# Patient Record
Sex: FEMALE | Race: WHITE | Employment: OTHER | URBAN - METROPOLITAN AREA
[De-identification: names, ages, dates, MRNs, and addresses within clinical notes are randomized per-mention and may not be internally consistent; named-entity substitution may affect disease eponyms.]

---

## 2018-03-16 ENCOUNTER — APPOINTMENT (OUTPATIENT)
Dept: CT IMAGING | Age: 83
End: 2018-03-16
Attending: NURSE PRACTITIONER
Payer: MEDICARE

## 2018-03-16 ENCOUNTER — APPOINTMENT (OUTPATIENT)
Dept: GENERAL RADIOLOGY | Age: 83
End: 2018-03-16
Attending: NURSE PRACTITIONER
Payer: MEDICARE

## 2018-03-16 ENCOUNTER — HOSPITAL ENCOUNTER (EMERGENCY)
Age: 83
Discharge: HOME | End: 2018-03-16
Attending: EMERGENCY MEDICINE
Payer: MEDICARE

## 2018-03-16 VITALS
HEIGHT: 64 IN | DIASTOLIC BLOOD PRESSURE: 80 MMHG | HEART RATE: 75 BPM | SYSTOLIC BLOOD PRESSURE: 170 MMHG | BODY MASS INDEX: 20.49 KG/M2 | RESPIRATION RATE: 18 BRPM | WEIGHT: 120 LBS | TEMPERATURE: 97.7 F | OXYGEN SATURATION: 95 %

## 2018-03-16 DIAGNOSIS — S01.01XA LACERATION OF SCALP, INITIAL ENCOUNTER: Primary | ICD-10-CM
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
## 2018-03-16 DIAGNOSIS — M54.50 MIDLINE LOW BACK PAIN WITHOUT SCIATICA, UNSPECIFIED CHRONICITY: ICD-10-CM

## 2018-03-16 PROCEDURE — 77030018836 HC SOL IRR NACL ICUM -A

## 2018-03-16 PROCEDURE — 72100 X-RAY EXAM L-S SPINE 2/3 VWS: CPT

## 2018-03-16 PROCEDURE — 99283 EMERGENCY DEPT VISIT LOW MDM: CPT

## 2018-03-16 PROCEDURE — 77030008460 HC STPLR SKN PRECIS 3M -A

## 2018-03-16 PROCEDURE — 74011000250 HC RX REV CODE- 250: Performed by: EMERGENCY MEDICINE

## 2018-03-16 PROCEDURE — 70450 CT HEAD/BRAIN W/O DYE: CPT

## 2018-03-16 PROCEDURE — 75810000293 HC SIMP/SUPERF WND  RPR

## 2018-03-16 RX ADMIN — Medication 2 ML: at 19:26

## 2018-03-16 NOTE — DISCHARGE INSTRUCTIONS
Cuts Closed With Staples: Care Instructions  Your Care Instructions  A cut can happen anywhere on your body. The doctor used staples to close the cut. Staples easily and quickly close a cut, which helps the cut heal.  Sometimes a cut can injure tendons, blood vessels, or nerves. If the cut went deep and through the skin, the doctor may have put in a layer of stitches below the staples. The deeper layer of stitches brings the deep part of the cut together. These stitches will dissolve and don't need to be removed. The staples in the upper layer are what you see on the cut. You may have a bandage. You will need to have the staples removed, usually in 7 to 14 days. The doctor has checked you carefully, but problems can develop later. If you notice any problems or new symptoms, get medical treatment right away. Follow-up care is a key part of your treatment and safety. Be sure to make and go to all appointments, and call your doctor if you are having problems. It's also a good idea to know your test results and keep a list of the medicines you take. How can you care for yourself at home? · Keep the cut dry for the first 24 to 48 hours. After this, you can shower if your doctor okays it. Pat the cut dry. · Don't soak the cut, such as in a bathtub. Your doctor will tell you when it's safe to get the cut wet. · If your doctor told you how to care for your cut, follow your doctor's instructions. If you did not get instructions, follow this general advice:  ¨ After the first 24 to 48 hours, wash around the cut with clean water 2 times a day. Don't use hydrogen peroxide or alcohol, which can slow healing. ¨ You may cover the cut with a thin layer of petroleum jelly, such as Vaseline, and a nonstick bandage. ¨ Apply more petroleum jelly and replace the bandage as needed. · Avoid any activity that could cause your cut to reopen. · Do not remove the staples on your own.  Your doctor will tell you when to come back to have the staples removed. · Take pain medicines exactly as directed. ¨ If the doctor gave you a prescription medicine for pain, take it as prescribed. ¨ If you are not taking a prescription pain medicine, ask your doctor if you can take an over-the-counter medicine. When should you call for help? Call your doctor now or seek immediate medical care if:  ? · You have new pain, or your pain gets worse. ? · The skin near the cut is cold or pale or changes color. ? · You have tingling, weakness, or numbness near the cut.   ? · The cut starts to bleed, and blood soaks through the bandage. Oozing small amounts of blood is normal.   ? · You have trouble moving the area near the cut.   ? · You have symptoms of infection, such as:  ¨ Increased pain, swelling, warmth, or redness around the cut. ¨ Red streaks leading from the cut. ¨ Pus draining from the cut. ¨ A fever. ? Watch closely for changes in your health, and be sure to contact your doctor if:  ? · You do not get better as expected. Where can you learn more? Go to http://erin-sandra.info/. Enter G556 in the search box to learn more about \"Cuts Closed With Staples: Care Instructions. \"  Current as of: March 20, 2017  Content Version: 11.4  © 6332-6244 [employer]. Care instructions adapted under license by [employer] (which disclaims liability or warranty for this information). If you have questions about a medical condition or this instruction, always ask your healthcare professional. [place] [address] any warranty or liability for your use of this information.

## 2018-03-16 NOTE — ED PROVIDER NOTES
HPI       80y F here s/p fall. Had a mechanical fall when transferring from wheelchair to chair. Hit the back of her head she thinks on a closet door. No LOC. No vomiting. Occurred around 4pm today. Has a cut to the top of the head but bleeding controlled. Has chronic back pain and feels this is worse in the lower back. At baseline mental status. No other complaints. No weakness or numbness. Not on blood thinners.     Past Medical History:   Diagnosis Date    Arrhythmia     a fib    Arthritis     osteo    CAD (coronary artery disease)     2 heart attacks    GERD (gastroesophageal reflux disease)     Hypertension     Other ill-defined conditions(799.89)     heart murmur    Other ill-defined conditions(799.89)     urge incontinence/bladder stimulator    Other ill-defined conditions(799.89)     constipation    Other ill-defined conditions(799.89)     high cholesterol    Other ill-defined conditions(799.89)     recurrent UTI's    Other ill-defined conditions(799.89)     osteoporosis    Other ill-defined conditions(799.89)     hyperlipidemia    Other ill-defined conditions(799.89)     ischemic colitis    Other ill-defined conditions(799.89)     foot drop    Other ill-defined conditions(799.89)     peripheral neuropathy    Psychiatric disorder     anxiety    Stroke ([facility] [address]     2 strokes/TIA - right hemiparesis, word finding difficulty    Thromboembolus (HCC)      right arm/blood clot in abdomen    Thyroid disease     hypothyroidism    Unspecified adverse effect of anesthesia     aspiration pneumonia with hip replacement       Past Surgical History:   Procedure Laterality Date    BREAST SURGERY PROCEDURE UNLISTED      multiple lumps removed from both breasts - all benign    CABG, ARTERY-VEIN, THREE      CARDIAC SURG PROCEDURE UNLIST      cardiac cath    HX APPENDECTOMY      HX GI      paraesophageal hernia repair    HX GYN      1/2 right ovary removed    HX HEENT      tonsillectomy    HX HEENT eyelid surgery    HX HYSTERECTOMY      MANDEEP with SSO    HX HYSTERECTOMY      multiple D & C's    HX LAP CHOLECYSTECTOMY      HX ORTHOPAEDIC      right THR    HX ORTHOPAEDIC      rotator cuff repair - left    HX ORTHOPAEDIC      2 foot surgeries - one for fallen metatarsal    HX ORTHOPAEDIC      left TKR    HX OTHER SURGICAL      EGD         Family History:   Problem Relation Age of Onset    Breast Cancer Mother     Cancer Father      lung    Cancer Son      liver and pancreatic    Cancer Son      lymphoma       Social History     Social History    Marital status:      Spouse name: N/A    Number of children: N/A    Years of education: N/A     Occupational History    Not on file. Social History Main Topics    Smoking status: Never Smoker    Smokeless tobacco: Not on file    Alcohol use No    Drug use: Not on file    Sexual activity: Not on file     Other Topics Concern    Not on file     Social History Narrative         ALLERGIES: Levaquin [levofloxacin]; Quinolones; Codeine; Evista [raloxifene]; Mevacor [lovastatin]; Morphine; Perfume ht52; and Sulfa (sulfonamide antibiotics)    Review of Systems   Review of Systems   Constitutional: (-) weight loss. HEENT: (-) stiff neck   Eyes: (-) discharge. Respiratory: (-) for cough. Cardiovascular: (-) syncope. Gastrointestinal: (-) blood in stool. Genitourinary: (-) hematuria. Musculoskeletal: (-) myalgias. Neurological: (-) seizure. Skin: (-) petechiae  Lymph/Immunologic: (-) enlarged lymph nodes  All other systems reviewed and are negative. Vitals:    03/16/18 1740   BP: 167/85   Pulse: 77   Resp: 16   Temp: 97.5 °F (36.4 °C)   SpO2: 95%   Weight: 54.4 kg (120 lb)   Height: 5' 3.5\" (1.613 m)            Physical Exam Nursing note and vitals reviewed. Constitutional: oriented to person, place, and time. appears elderly and frail. No distress. Head: Normocephalic and 1cm laceration to the top of the head.  No active bleeding. Sclera anicteric  Nose: No rhinorrhea  Mouth/Throat: Oropharynx is clear and moist. Pharynx normal  Eyes: Conjunctivae are normal. Pupils are equal, round, and reactive to light. Right eye exhibits no discharge. Left eye exhibits no discharge. Neck: Painless normal range of motion. Neck supple. No LAD. Cardiovascular: Normal rate, regular rhythm, normal heart sounds and intact distal pulses. Exam reveals no gallop and no friction rub. No murmur heard. Pulmonary/Chest:  No respiratory distress. No wheezes. No rales. No rhonchi. No increased work of breathing. No accessory muscle use. Good air exchange throughout. Abdominal: soft, non-tender, no rebound or guarding. No hepatosplenomegaly. Normal bowel sounds throughout. Back: (+) tenderness to palpation of lumbar spine diffusely, no deformities, no CVA tenderness  Extremities/Musculoskeletal: Normal range of motion. no tenderness. No edema. Distal extremities are neurovasc intact. Lymphadenopathy:   No adenopathy. Neurological:  Alert and oriented to person, place, and time. Coordination normal. CN 2-12 intact. Motor and sensory function intact. Skin: Skin is warm and dry. No rash noted. No pallor. MDM 80y F here s/p fall. Appears well. Plan for CT head and xray lumbar spine. Will repair the scalp wound with staples. ED Course       Wound Repair  Date/Time: 3/16/2018 7:55 PM  Performed by: attendingPreparation: skin prepped with ChloraPrep  Location details: scalp    Anesthesia:  Local Anesthetic: LET (lido,epi,tetracaine)  Foreign bodies: no foreign bodies  Irrigation solution: saline  Irrigation method: tap  Debridement: none  Skin closure: staples  Number of sutures: 4  Technique: simple  Approximation: close  Patient tolerance: Patient tolerated the procedure well with no immediate complications  My total time at bedside, performing this procedure was 1-15 minutes.

## 2018-03-16 NOTE — ED TRIAGE NOTES
Per EMS pt fell while transferring herself from the chair to recliner when she lost her balance. Pt fell to floor hitting back of head on floor. Pt has LAC to head with bleeding controlled. Pt denies having LOC. Fall was witnessed by volunteer. Pt also reports lower back pain.

## 2018-03-17 NOTE — ED NOTES
Patient medically cleared for discharge at this time. All discharge instructions provided at this time and questions answered. Patient assisted into a wheelchair and assisted into the car.

## 2018-12-12 ENCOUNTER — APPOINTMENT (OUTPATIENT)
Dept: CT IMAGING | Age: 83
DRG: 699 | End: 2018-12-12
Attending: EMERGENCY MEDICINE
Payer: MEDICARE

## 2018-12-12 ENCOUNTER — HOSPITAL ENCOUNTER (INPATIENT)
Age: 83
LOS: 4 days | Discharge: NURSING, REHAB OR LONG-TERM CARE | DRG: 699 | End: 2018-12-16
Attending: EMERGENCY MEDICINE | Admitting: INTERNAL MEDICINE
Payer: MEDICARE

## 2018-12-12 DIAGNOSIS — R10.84 ABDOMINAL PAIN, GENERALIZED: Primary | ICD-10-CM

## 2018-12-12 DIAGNOSIS — I71.40 ABDOMINAL AORTIC ANEURYSM (AAA) WITHOUT RUPTURE: ICD-10-CM

## 2018-12-12 PROBLEM — I71.20 THORACIC AORTIC ANEURYSM: Status: ACTIVE | Noted: 2018-12-12

## 2018-12-12 LAB
ALBUMIN SERPL-MCNC: 3.9 G/DL (ref 3.5–5)
ALBUMIN/GLOB SERPL: 1.2 {RATIO} (ref 1.1–2.2)
ALP SERPL-CCNC: 82 U/L (ref 45–117)
ALT SERPL-CCNC: 20 U/L (ref 12–78)
ANION GAP SERPL CALC-SCNC: 8 MMOL/L (ref 5–15)
AST SERPL-CCNC: 19 U/L (ref 15–37)
BASOPHILS # BLD: 0.1 K/UL (ref 0–0.1)
BASOPHILS NFR BLD: 1 % (ref 0–1)
BILIRUB SERPL-MCNC: 0.3 MG/DL (ref 0.2–1)
BUN SERPL-MCNC: 33 MG/DL (ref 6–20)
BUN/CREAT SERPL: 23 (ref 12–20)
CALCIUM SERPL-MCNC: 10.3 MG/DL (ref 8.5–10.1)
CHLORIDE SERPL-SCNC: 107 MMOL/L (ref 97–108)
CO2 SERPL-SCNC: 25 MMOL/L (ref 21–32)
COMMENT, HOLDF: NORMAL
CREAT SERPL-MCNC: 1.46 MG/DL (ref 0.55–1.02)
DIFFERENTIAL METHOD BLD: ABNORMAL
EOSINOPHIL # BLD: 0.2 K/UL (ref 0–0.4)
EOSINOPHIL NFR BLD: 2 % (ref 0–7)
ERYTHROCYTE [DISTWIDTH] IN BLOOD BY AUTOMATED COUNT: 15.5 % (ref 11.5–14.5)
GLOBULIN SER CALC-MCNC: 3.3 G/DL (ref 2–4)
GLUCOSE SERPL-MCNC: 104 MG/DL (ref 65–100)
HCT VFR BLD AUTO: 30 % (ref 35–47)
HGB BLD-MCNC: 9 G/DL (ref 11.5–16)
IMM GRANULOCYTES # BLD: 0 K/UL (ref 0–0.04)
IMM GRANULOCYTES NFR BLD AUTO: 1 % (ref 0–0.5)
LYMPHOCYTES # BLD: 2.2 K/UL (ref 0.8–3.5)
LYMPHOCYTES NFR BLD: 24 % (ref 12–49)
MCH RBC QN AUTO: 24.2 PG (ref 26–34)
MCHC RBC AUTO-ENTMCNC: 30 G/DL (ref 30–36.5)
MCV RBC AUTO: 80.6 FL (ref 80–99)
MONOCYTES # BLD: 0.9 K/UL (ref 0–1)
MONOCYTES NFR BLD: 11 % (ref 5–13)
NEUTS SEG # BLD: 5.5 K/UL (ref 1.8–8)
NEUTS SEG NFR BLD: 62 % (ref 32–75)
NRBC # BLD: 0 K/UL (ref 0–0.01)
NRBC BLD-RTO: 0 PER 100 WBC
PLATELET # BLD AUTO: 294 K/UL (ref 150–400)
PMV BLD AUTO: 9.8 FL (ref 8.9–12.9)
POTASSIUM SERPL-SCNC: 4.4 MMOL/L (ref 3.5–5.1)
PROT SERPL-MCNC: 7.2 G/DL (ref 6.4–8.2)
RBC # BLD AUTO: 3.72 M/UL (ref 3.8–5.2)
SAMPLES BEING HELD,HOLD: NORMAL
SODIUM SERPL-SCNC: 140 MMOL/L (ref 136–145)
WBC # BLD AUTO: 8.9 K/UL (ref 3.6–11)

## 2018-12-12 PROCEDURE — 85025 COMPLETE CBC W/AUTO DIFF WBC: CPT

## 2018-12-12 PROCEDURE — 74011636320 HC RX REV CODE- 636/320: Performed by: RADIOLOGY

## 2018-12-12 PROCEDURE — 74174 CTA ABD&PLVS W/CONTRAST: CPT

## 2018-12-12 PROCEDURE — 74011250636 HC RX REV CODE- 250/636: Performed by: EMERGENCY MEDICINE

## 2018-12-12 PROCEDURE — 36415 COLL VENOUS BLD VENIPUNCTURE: CPT

## 2018-12-12 PROCEDURE — 74011000258 HC RX REV CODE- 258: Performed by: RADIOLOGY

## 2018-12-12 PROCEDURE — 99285 EMERGENCY DEPT VISIT HI MDM: CPT

## 2018-12-12 PROCEDURE — 96374 THER/PROPH/DIAG INJ IV PUSH: CPT

## 2018-12-12 PROCEDURE — 77030034850

## 2018-12-12 PROCEDURE — 94761 N-INVAS EAR/PLS OXIMETRY MLT: CPT

## 2018-12-12 PROCEDURE — 71275 CT ANGIOGRAPHY CHEST: CPT

## 2018-12-12 PROCEDURE — 65610000006 HC RM INTENSIVE CARE

## 2018-12-12 PROCEDURE — 80053 COMPREHEN METABOLIC PANEL: CPT

## 2018-12-12 RX ORDER — FENTANYL CITRATE 50 UG/ML
25 INJECTION, SOLUTION INTRAMUSCULAR; INTRAVENOUS
Status: COMPLETED | OUTPATIENT
Start: 2018-12-12 | End: 2018-12-13

## 2018-12-12 RX ORDER — LEVOTHYROXINE SODIUM 100 UG/1
100 TABLET ORAL
COMMUNITY

## 2018-12-12 RX ORDER — MECLIZINE HCL 12.5 MG 12.5 MG/1
12.5 TABLET ORAL
COMMUNITY

## 2018-12-12 RX ORDER — ACETAMINOPHEN 325 MG/1
650 TABLET ORAL
COMMUNITY

## 2018-12-12 RX ORDER — OMEPRAZOLE 20 MG/1
20 CAPSULE, DELAYED RELEASE ORAL DAILY
COMMUNITY
End: 2018-12-16

## 2018-12-12 RX ORDER — GUAIFENESIN 100 MG/5ML
100 SOLUTION ORAL
COMMUNITY

## 2018-12-12 RX ORDER — ROPINIROLE 0.5 MG/1
0.5 TABLET, FILM COATED ORAL
COMMUNITY

## 2018-12-12 RX ORDER — GABAPENTIN 100 MG/1
100 CAPSULE ORAL
COMMUNITY

## 2018-12-12 RX ORDER — HYDRALAZINE HYDROCHLORIDE 10 MG/1
20 TABLET, FILM COATED ORAL 4 TIMES DAILY
COMMUNITY

## 2018-12-12 RX ORDER — ONDANSETRON 4 MG/1
4 TABLET, ORALLY DISINTEGRATING ORAL
COMMUNITY

## 2018-12-12 RX ORDER — SODIUM CHLORIDE, SODIUM LACTATE, POTASSIUM CHLORIDE, CALCIUM CHLORIDE 600; 310; 30; 20 MG/100ML; MG/100ML; MG/100ML; MG/100ML
75 INJECTION, SOLUTION INTRAVENOUS CONTINUOUS
Status: DISCONTINUED | OUTPATIENT
Start: 2018-12-12 | End: 2018-12-13

## 2018-12-12 RX ORDER — ERGOCALCIFEROL 1.25 MG/1
50000 CAPSULE ORAL
COMMUNITY

## 2018-12-12 RX ORDER — ACETAMINOPHEN 325 MG/1
650 TABLET ORAL
Status: DISCONTINUED | OUTPATIENT
Start: 2018-12-12 | End: 2018-12-16 | Stop reason: HOSPADM

## 2018-12-12 RX ORDER — ASPIRIN 81 MG/1
81 TABLET ORAL DAILY
COMMUNITY

## 2018-12-12 RX ORDER — FENTANYL CITRATE 50 UG/ML
25 INJECTION, SOLUTION INTRAMUSCULAR; INTRAVENOUS
Status: COMPLETED | OUTPATIENT
Start: 2018-12-12 | End: 2018-12-12

## 2018-12-12 RX ORDER — CEPHALEXIN 250 MG/1
250 CAPSULE ORAL DAILY
COMMUNITY
End: 2018-12-16

## 2018-12-12 RX ORDER — POLYETHYLENE GLYCOL 3350 17 G/17G
17 POWDER, FOR SOLUTION ORAL
COMMUNITY

## 2018-12-12 RX ORDER — ACETAMINOPHEN 500 MG
500 TABLET ORAL 2 TIMES DAILY
COMMUNITY

## 2018-12-12 RX ORDER — FUROSEMIDE 20 MG/1
20 TABLET ORAL DAILY
COMMUNITY
End: 2018-12-16

## 2018-12-12 RX ORDER — SODIUM CHLORIDE 0.9 % (FLUSH) 0.9 %
10 SYRINGE (ML) INJECTION
Status: COMPLETED | OUTPATIENT
Start: 2018-12-12 | End: 2018-12-12

## 2018-12-12 RX ADMIN — IOPAMIDOL 100 ML: 755 INJECTION, SOLUTION INTRAVENOUS at 21:50

## 2018-12-12 RX ADMIN — FENTANYL CITRATE 25 MCG: 50 INJECTION, SOLUTION INTRAMUSCULAR; INTRAVENOUS at 21:43

## 2018-12-12 RX ADMIN — Medication 10 ML: at 21:50

## 2018-12-12 RX ADMIN — SODIUM CHLORIDE 100 ML: 900 INJECTION, SOLUTION INTRAVENOUS at 21:49

## 2018-12-13 ENCOUNTER — APPOINTMENT (OUTPATIENT)
Dept: CT IMAGING | Age: 83
DRG: 699 | End: 2018-12-13
Attending: INTERNAL MEDICINE
Payer: MEDICARE

## 2018-12-13 LAB
ALBUMIN SERPL-MCNC: 3.1 G/DL (ref 3.5–5)
ALBUMIN/GLOB SERPL: 1.1 {RATIO} (ref 1.1–2.2)
ALP SERPL-CCNC: 65 U/L (ref 45–117)
ALT SERPL-CCNC: 16 U/L (ref 12–78)
AST SERPL-CCNC: 18 U/L (ref 15–37)
BILIRUB DIRECT SERPL-MCNC: <0.1 MG/DL (ref 0–0.2)
BILIRUB SERPL-MCNC: 0.3 MG/DL (ref 0.2–1)
FERRITIN SERPL-MCNC: 18 NG/ML (ref 8–252)
FOLATE SERPL-MCNC: 7.8 NG/ML (ref 5–21)
GLOBULIN SER CALC-MCNC: 2.9 G/DL (ref 2–4)
INR PPP: 1.1 (ref 0.9–1.1)
IRON SATN MFR SERPL: 7 % (ref 20–50)
IRON SERPL-MCNC: 26 UG/DL (ref 35–150)
IRON SERPL-MCNC: 26 UG/DL (ref 35–150)
MAGNESIUM SERPL-MCNC: 2 MG/DL (ref 1.6–2.4)
PROT SERPL-MCNC: 6 G/DL (ref 6.4–8.2)
PROTHROMBIN TIME: 11.3 SEC (ref 9–11.1)
TIBC SERPL-MCNC: 381 UG/DL (ref 250–450)
TSH SERPL DL<=0.05 MIU/L-ACNC: 4.32 UIU/ML (ref 0.36–3.74)
VIT B12 SERPL-MCNC: 512 PG/ML (ref 193–986)

## 2018-12-13 PROCEDURE — 74011000250 HC RX REV CODE- 250: Performed by: INTERNAL MEDICINE

## 2018-12-13 PROCEDURE — 74011250636 HC RX REV CODE- 250/636: Performed by: INTERNAL MEDICINE

## 2018-12-13 PROCEDURE — 74011250637 HC RX REV CODE- 250/637: Performed by: INTERNAL MEDICINE

## 2018-12-13 PROCEDURE — 70450 CT HEAD/BRAIN W/O DYE: CPT

## 2018-12-13 PROCEDURE — 85610 PROTHROMBIN TIME: CPT

## 2018-12-13 PROCEDURE — 83540 ASSAY OF IRON: CPT

## 2018-12-13 PROCEDURE — 82607 VITAMIN B-12: CPT

## 2018-12-13 PROCEDURE — 82728 ASSAY OF FERRITIN: CPT

## 2018-12-13 PROCEDURE — 74011250636 HC RX REV CODE- 250/636: Performed by: EMERGENCY MEDICINE

## 2018-12-13 PROCEDURE — 74011000258 HC RX REV CODE- 258: Performed by: INTERNAL MEDICINE

## 2018-12-13 PROCEDURE — 36415 COLL VENOUS BLD VENIPUNCTURE: CPT

## 2018-12-13 PROCEDURE — 65660000001 HC RM ICU INTERMED STEPDOWN

## 2018-12-13 PROCEDURE — 84443 ASSAY THYROID STIM HORMONE: CPT

## 2018-12-13 PROCEDURE — 80076 HEPATIC FUNCTION PANEL: CPT

## 2018-12-13 PROCEDURE — 83735 ASSAY OF MAGNESIUM: CPT

## 2018-12-13 PROCEDURE — 82746 ASSAY OF FOLIC ACID SERUM: CPT

## 2018-12-13 RX ORDER — POLYETHYLENE GLYCOL 3350 17 G/17G
17 POWDER, FOR SOLUTION ORAL
Status: DISCONTINUED | OUTPATIENT
Start: 2018-12-13 | End: 2018-12-16 | Stop reason: HOSPADM

## 2018-12-13 RX ORDER — LEVOTHYROXINE SODIUM 100 UG/1
100 TABLET ORAL
Status: DISCONTINUED | OUTPATIENT
Start: 2018-12-13 | End: 2018-12-16 | Stop reason: HOSPADM

## 2018-12-13 RX ORDER — ROPINIROLE 0.25 MG/1
0.5 TABLET, FILM COATED ORAL
Status: DISCONTINUED | OUTPATIENT
Start: 2018-12-13 | End: 2018-12-16 | Stop reason: HOSPADM

## 2018-12-13 RX ORDER — PANTOPRAZOLE SODIUM 40 MG/1
40 TABLET, DELAYED RELEASE ORAL
Status: DISCONTINUED | OUTPATIENT
Start: 2018-12-13 | End: 2018-12-16 | Stop reason: HOSPADM

## 2018-12-13 RX ORDER — ASPIRIN 81 MG/1
81 TABLET ORAL DAILY
Status: DISCONTINUED | OUTPATIENT
Start: 2018-12-13 | End: 2018-12-16 | Stop reason: HOSPADM

## 2018-12-13 RX ORDER — GABAPENTIN 100 MG/1
100 CAPSULE ORAL
Status: DISCONTINUED | OUTPATIENT
Start: 2018-12-13 | End: 2018-12-16 | Stop reason: HOSPADM

## 2018-12-13 RX ORDER — SODIUM CHLORIDE 0.9 % (FLUSH) 0.9 %
5-10 SYRINGE (ML) INJECTION EVERY 8 HOURS
Status: DISCONTINUED | OUTPATIENT
Start: 2018-12-13 | End: 2018-12-16 | Stop reason: HOSPADM

## 2018-12-13 RX ORDER — GUAIFENESIN 100 MG/5ML
100 SOLUTION ORAL
Status: DISCONTINUED | OUTPATIENT
Start: 2018-12-13 | End: 2018-12-16 | Stop reason: HOSPADM

## 2018-12-13 RX ORDER — OMEPRAZOLE 20 MG/1
20 CAPSULE, DELAYED RELEASE ORAL DAILY
Status: DISCONTINUED | OUTPATIENT
Start: 2018-12-13 | End: 2018-12-13 | Stop reason: CLARIF

## 2018-12-13 RX ORDER — HEPARIN SODIUM 5000 [USP'U]/ML
5000 INJECTION, SOLUTION INTRAVENOUS; SUBCUTANEOUS EVERY 8 HOURS
Status: DISCONTINUED | OUTPATIENT
Start: 2018-12-13 | End: 2018-12-13

## 2018-12-13 RX ORDER — SODIUM CHLORIDE 0.9 % (FLUSH) 0.9 %
5-10 SYRINGE (ML) INJECTION AS NEEDED
Status: DISCONTINUED | OUTPATIENT
Start: 2018-12-13 | End: 2018-12-16 | Stop reason: HOSPADM

## 2018-12-13 RX ORDER — MECLIZINE HCL 12.5 MG 12.5 MG/1
12.5 TABLET ORAL
Status: DISCONTINUED | OUTPATIENT
Start: 2018-12-13 | End: 2018-12-16 | Stop reason: HOSPADM

## 2018-12-13 RX ORDER — HYDROMORPHONE HYDROCHLORIDE 2 MG/ML
0.2 INJECTION, SOLUTION INTRAMUSCULAR; INTRAVENOUS; SUBCUTANEOUS
Status: DISCONTINUED | OUTPATIENT
Start: 2018-12-13 | End: 2018-12-16 | Stop reason: HOSPADM

## 2018-12-13 RX ORDER — ONDANSETRON 2 MG/ML
4 INJECTION INTRAMUSCULAR; INTRAVENOUS
Status: DISCONTINUED | OUTPATIENT
Start: 2018-12-13 | End: 2018-12-16 | Stop reason: HOSPADM

## 2018-12-13 RX ORDER — BISACODYL 5 MG
5 TABLET, DELAYED RELEASE (ENTERIC COATED) ORAL DAILY PRN
Status: DISCONTINUED | OUTPATIENT
Start: 2018-12-13 | End: 2018-12-16 | Stop reason: HOSPADM

## 2018-12-13 RX ADMIN — ROPINIROLE HYDROCHLORIDE 0.5 MG: 0.25 TABLET, FILM COATED ORAL at 21:38

## 2018-12-13 RX ADMIN — LEVOTHYROXINE SODIUM 100 MCG: 100 TABLET ORAL at 09:10

## 2018-12-13 RX ADMIN — PANTOPRAZOLE SODIUM 40 MG: 40 TABLET, DELAYED RELEASE ORAL at 09:10

## 2018-12-13 RX ADMIN — Medication 10 ML: at 21:39

## 2018-12-13 RX ADMIN — POLYETHYLENE GLYCOL 3350 17 G: 17 POWDER, FOR SOLUTION ORAL at 21:39

## 2018-12-13 RX ADMIN — GABAPENTIN 100 MG: 100 CAPSULE ORAL at 21:38

## 2018-12-13 RX ADMIN — LABETALOL HYDROCHLORIDE 2 MG/MIN: 5 INJECTION, SOLUTION INTRAVENOUS at 08:57

## 2018-12-13 RX ADMIN — ASPIRIN 81 MG: 81 TABLET, COATED ORAL at 09:10

## 2018-12-13 RX ADMIN — HEPARIN SODIUM 5000 UNITS: 5000 INJECTION, SOLUTION INTRAVENOUS; SUBCUTANEOUS at 02:30

## 2018-12-13 RX ADMIN — ACETAMINOPHEN 650 MG: 325 TABLET ORAL at 11:45

## 2018-12-13 RX ADMIN — FENTANYL CITRATE 25 MCG: 50 INJECTION, SOLUTION INTRAMUSCULAR; INTRAVENOUS at 00:40

## 2018-12-13 RX ADMIN — SODIUM CHLORIDE 2.5 MG/HR: 900 INJECTION, SOLUTION INTRAVENOUS at 00:49

## 2018-12-13 RX ADMIN — HYDROMORPHONE HYDROCHLORIDE 0.2 MG: 2 INJECTION INTRAMUSCULAR; INTRAVENOUS; SUBCUTANEOUS at 05:36

## 2018-12-13 RX ADMIN — SODIUM CHLORIDE, SODIUM LACTATE, POTASSIUM CHLORIDE, AND CALCIUM CHLORIDE 75 ML/HR: 600; 310; 30; 20 INJECTION, SOLUTION INTRAVENOUS at 00:48

## 2018-12-13 RX ADMIN — Medication 10 ML: at 16:01

## 2018-12-13 NOTE — ED PROVIDER NOTES
80 y.o. female with extensive past medical history, please see list, significant for atrial fibrillation, CAD, HTN, DVT, GERD, anxiety, CVA, TIA, ischemic colitis, who presents via EMS with son to the ED with cc of vaginal pain. Per son, pt has been complaining today of vaginal pain with a sensation of \"something pulling and hurting her vagina. \" He states she has been asking staff at her living facility to \"pull down her pants because they're too tight. \" Son reports pt has a suprapubic catheter in place that was last changed 2 weeks ago. He states her last BM was earlier today. Of note, he states she had a hernia to her lower abdomen \"a long, long time ago\" that would \"sometimes come out and go back in.\"    There are no other acute medical concerns at this time. Social Hx: Never Tobacco use, denies EtOH use  PCP: Ramiro Bass MD    Note written by Geri Mims, as dictated by Josh Calderon MD 7:56 PM        The history is provided by the patient and a relative. No  was used.         Past Medical History:   Diagnosis Date    Arrhythmia     a fib    Arthritis     osteo    CAD (coronary artery disease)     2 heart attacks    GERD (gastroesophageal reflux disease)     Hypertension     Other ill-defined conditions(799.89)     heart murmur    Other ill-defined conditions(799.89)     urge incontinence/bladder stimulator    Other ill-defined conditions(799.89)     constipation    Other ill-defined conditions(799.89)     high cholesterol    Other ill-defined conditions(799.89)     recurrent UTI's    Other ill-defined conditions(799.89)     osteoporosis    Other ill-defined conditions(799.89)     hyperlipidemia    Other ill-defined conditions(799.89)     ischemic colitis    Other ill-defined conditions(799.89)     foot drop    Other ill-defined conditions(799.89)     peripheral neuropathy    Psychiatric disorder     anxiety    Stroke [facility])     2 strokes/TIA - right hemiparesis, word finding difficulty    Thromboembolus ([address]      right arm/blood clot in abdomen    Thyroid disease     hypothyroidism    Unspecified adverse effect of anesthesia     aspiration pneumonia with hip replacement       Past Surgical History:   Procedure Laterality Date    BREAST SURGERY PROCEDURE UNLISTED      multiple lumps removed from both breasts - all benign    CABG, ARTERY-VEIN, THREE      CARDIAC SURG PROCEDURE UNLIST      cardiac cath    HX APPENDECTOMY      HX GI      paraesophageal hernia repair    HX GYN      1/2 right ovary removed    HX HEENT      tonsillectomy    HX HEENT      eyelid surgery    HX HYSTERECTOMY      MANDEEP with SSO    HX HYSTERECTOMY      multiple D & C's    HX LAP CHOLECYSTECTOMY      HX ORTHOPAEDIC      right THR    HX ORTHOPAEDIC      rotator cuff repair - left    HX ORTHOPAEDIC      2 foot surgeries - one for fallen metatarsal    HX ORTHOPAEDIC      left TKR    HX OTHER SURGICAL      EGD         Family History:   Problem Relation Age of Onset    Breast Cancer Mother     Cancer Father         lung    Cancer Son         liver and pancreatic    Cancer Son         lymphoma       Social History     Socioeconomic History    Marital status:      Spouse name: Not on file    Number of children: Not on file    Years of education: Not on file    Highest education level: Not on file   Social Needs    Financial resource strain: Not on file    Food insecurity - worry: Not on file    Food insecurity - inability: Not on file   Paddle[employer] needs - medical: Not on file   [employer] needs - non-medical: Not on file   Occupational History    Not on file   Tobacco Use    Smoking status: Never Smoker   Substance and Sexual Activity    Alcohol use: No    Drug use: Not on file    Sexual activity: Not on file   Other Topics Concern    Not on file   Social History Narrative    Not on file         ALLERGIES: Levaquin [levofloxacin]; Quinolones; Codeine; Evista [raloxifene]; Mevacor [lovastatin]; Morphine; Perfume ht52; and Sulfa (sulfonamide antibiotics)    Review of Systems   Constitutional: Negative for chills and fever. HENT: Negative for rhinorrhea and sore throat. Respiratory: Negative for cough and shortness of breath. Cardiovascular: Negative for chest pain. Gastrointestinal: Negative for abdominal pain, diarrhea, nausea and vomiting. Genitourinary: Positive for vaginal pain. Negative for dysuria and urgency. Musculoskeletal: Negative for arthralgias and back pain. Skin: Negative for rash. Neurological: Negative for dizziness, weakness and light-headedness. There were no vitals filed for this visit. Physical Exam   Vital signs reviewed. Nursing notes reviewed. Const:  No acute distress, well developed, well nourished  Head:  Atraumatic, normocephalic  Eyes:  PERRL, conjunctiva normal, no scleral icterus  Neck:  Supple, trachea midline  Cardiovascular:  RRR, no murmurs, no gallops, no rubs  Resp:  No resp distress, no increased work of breathing, no wheezes, no rhonchi, no rales,  Abd:  Soft, non-tender, non-distended, no rebound, no guarding, no CVA tenderness, large plum-sized firm hernia to LLQ, was able to successfully reduce it  :  No swelling or erythema on external genitalia  MSK:  No pedal edema, normal ROM  Neuro:  Alert and oriented x3, no cranial nerve defect  Skin:  Warm, dry, intact  Psych: normal mood and affect, behavior is normal, judgement and thought content is normal    Note written by Geri West, as dictated by Kearney Spurling, MD 7:56 PM    MDM  Number of Diagnoses or Management Options     Amount and/or Complexity of Data Reviewed  Clinical lab tests: ordered  Tests in the radiology section of CPT®: ordered and reviewed  Review and summarize past medical records: yes    Patient Progress  Patient progress: stable         Pt.  Presents to the ER with severe lower abdominal pain.  Easily reducible hernia. CT shows AAA without rupture or dissection. Unclear cause of her pain. Pt.  To be evaluated for admission by the hospitalist.      Procedures

## 2018-12-13 NOTE — ED TRIAGE NOTES
Triage Note:  Patient arrives from [facility] via EMS complaining of vaginal pain. Patient states \"I feel like something is pulling on my crotch\". Patient has suprapubic catheter that is changed monthly, son says it has been changed within the last 2 weeks. Patient follows commands, answers questions appropriately when spoken to, son is at bedside. 2028:  Patient is calling out, states that \"my legs feel like they are numb and tingly, I feel like I'm bent over, somebody help me\". Advised by family that patient has hx of abdominal aneurysm, notified MD.      Aracelis Ba:  Family leaving at this time, patient is resting quietly. 0215:  Patient is resting with eyes closed, deep even respirations noted. No acute s/s of distress or discomfort. Call bell within reach, will continue to monitor. 4371:  Blood drawn for labwork, patient moved to hospital bed, placed back on monitor, call bell at hand. Will continue to monitor. 0542:  Mouth care provided, patient complaining of pain, medicated per protocol. Patient tolerating sips of water.

## 2018-12-13 NOTE — PROGRESS NOTES
Vascular:    Elderly female with 5.6 cm infrarenal AAA. While her AAA is large enough to warrant repair based on size she is not a candidate for repair based on her age. Will discuss with her son. If her AAA were to rupture would recommend comfort care. No anticoagulation is needed for mural thrombus in AAA.

## 2018-12-13 NOTE — PROGRESS NOTES
Primary Nurse Marisa Hooker RN and THERESE Huber performed a dual skin assessment on this patient No impairment noted

## 2018-12-13 NOTE — PROGRESS NOTES
Admission Medication Reconciliation:    Information obtained from: Medication records from SNF     Significant PMH/Disease States:   Past Medical History:   Diagnosis Date    Arrhythmia     a fib    Arthritis     osteo    CAD (coronary artery disease)     2 heart attacks    GERD (gastroesophageal reflux disease)     Hypertension     Other ill-defined conditions(799.89)     heart murmur    Other ill-defined conditions(799.89)     urge incontinence/bladder stimulator    Other ill-defined conditions(799.89)     constipation    Other ill-defined conditions(799.89)     high cholesterol    Other ill-defined conditions(799.89)     recurrent UTI's    Other ill-defined conditions(799.89)     osteoporosis    Other ill-defined conditions(799.89)     hyperlipidemia    Other ill-defined conditions(799.89)     ischemic colitis    Other ill-defined conditions(799.89)     foot drop    Other ill-defined conditions(799.89)     peripheral neuropathy    Psychiatric disorder     anxiety    Stroke ([facility] [address]     2 strokes/TIA - right hemiparesis, word finding difficulty    Thromboembolus (HCC)      right arm/blood clot in abdomen    Thyroid disease     hypothyroidism    Unspecified adverse effect of anesthesia     aspiration pneumonia with hip replacement       Chief Complaint for this Admission:  Vaginal pain     Allergies:  Levaquin [levofloxacin]; Quinolones; Codeine; Evista [raloxifene]; Mevacor [lovastatin]; Morphine; Perfume ht52; and Sulfa (sulfonamide antibiotics)    Prior to Admission Medications:   Prior to Admission Medications   Prescriptions Last Dose Informant Patient Reported? Taking?   acetaminophen (TYLENOL) 325 mg tablet   Yes Yes   Sig: Take 650 mg by mouth every six (6) hours as needed for Pain. acetaminophen (TYLENOL) 500 mg tablet   Yes Yes   Sig: Take 500 mg by mouth two (2) times a day. aspirin delayed-release 81 mg tablet   Yes Yes   Sig: Take 81 mg by mouth daily.    cephALEXin (KEFLEX) 250 mg capsule   Yes Yes   Sig: Take 250 mg by mouth daily. ergocalciferol (VITAMIN D2) 50,000 unit capsule 12/7/2018  Yes Yes   Sig: Take 50,000 Units by mouth every seven (7) days. Friday   furosemide (LASIX) 20 mg tablet   Yes Yes   Sig: Take 20 mg by mouth daily. gabapentin (NEURONTIN) 100 mg capsule   Yes Yes   Sig: Take 100 mg by mouth nightly. guaiFENesin (ROBITUSSIN) 100 mg/5 mL liquid   Yes Yes   Sig: Take 100 mg by mouth every four (4) hours as needed for Cough. hydrALAZINE (APRESOLINE) 10 mg tablet   Yes Yes   Sig: Take 20 mg by mouth four (4) times daily. Hold for sbp<110 or pulse <60   levothyroxine (SYNTHROID) 100 mcg tablet   Yes Yes   Sig: Take 100 mcg by mouth Daily (before breakfast). meclizine (ANTIVERT) 12.5 mg tablet   Yes Yes   Sig: Take 12.5 mg by mouth every four (4) hours as needed for Nausea. omeprazole (PRILOSEC) 20 mg capsule   Yes Yes   Sig: Take 20 mg by mouth daily. ondansetron (ZOFRAN ODT) 4 mg disintegrating tablet   Yes Yes   Sig: Take 4 mg by mouth every six (6) hours as needed for Nausea. polyethylene glycol (MIRALAX) 17 gram packet   Yes Yes   Sig: Take 17 g by mouth nightly. rOPINIRole (REQUIP) 0.5 mg tablet   Yes Yes   Sig: Take 0.5 mg by mouth nightly. vit a,c & e-lutein-minerals (OCUVITE) tablet   Yes Yes   Sig: Take 1 Tab by mouth daily. Facility-Administered Medications: None         Comments/Recommendations: Med rec completed after review of medications records from [facility]. Since the patient has not been here since 2016, all of her medications were removed and re-entered with the current information.   Notable changes include:    Remove-Trazodone, Tramadol    Ashely Sanders, ShayD

## 2018-12-13 NOTE — PROGRESS NOTES
Spiritual Care Assessment/Progress Note  [facility]      NAME: Tesfaye Rodriguez      MRN: [id number]  AGE: 80 y.o.  SEX: female  Sikhism Affiliation: Presbyterian   Language: English     12/13/2018     Total Time (in minutes): 20     Spiritual Assessment begun in [address] through conversation with:         [x]Patient        [x] Family    [] Friend(s)        Reason for Consult: Emergency Department visit     Spiritual beliefs: (Please include comment if needed)     [x] Identifies with a sofia tradition:         [] Supported by a sofia community:            [] Claims no spiritual orientation:           [] Seeking spiritual identity:                [] Adheres to an individual form of spirituality:           [] Not able to assess:                           Identified resources for coping:      [x] Prayer                               [] Music                  [] Guided Imagery     [] Family/friends                 [] Pet visits     [] Devotional reading                         [] Unknown     [] Other:                                              Interventions offered during this visit: (See comments for more details)    Patient Interventions: Affirmation of sofia, Initial/Spiritual assessment, Critical care, Prayer (assurance of)           Plan of Care:     [x] Support spiritual and/or cultural needs    [] Support AMD and/or advance care planning process      [] Support grieving process   [] Coordinate Rites and/or Rituals    [] Coordination with community clergy   [] No spiritual needs identified at this time   [] Detailed Plan of Care below (See Comments)  [] Make referral to Music Therapy  [] Make referral to Pet Therapy     [] Make referral to Addiction services  [] Make referral to [facility]  [] Make referral to Spiritual Care Partner  [] No future visits requested        [x] Follow up visits as needed     Comments: Initial spiritual assessment in ER 14  Patient/family shared about detailed history of the family. Son of Pt shared about family living in the [facility]. Son shared about his father and how he was a good father and  to his mother. Son spoke about how fortunate he is to have his mother as long as he has. Pt will be 80 yrs old soon.  provided a listening ear as son and Pt shared life stories. Son stated that his mother is having difficulty remembering current issues. . Thierry Palafox offered continued prayed and Spiritual Support. Advised of  Availability.     Leopoldo Cogan,  Intern, MDiv  [phone])

## 2018-12-13 NOTE — PROGRESS NOTES
Bedside and Verbal shift change report given to [address] (oncoming nurse) by Jesus Manuel Reno (offgoing nurse). Report included the following information SBAR, Kardex, Intake/Output, MAR and Recent Results.

## 2018-12-13 NOTE — CONSULTS
[address]    Ro Osei  MR#: [id number]  : 1919  ACCOUNT #: [de-identified]   DATE OF SERVICE: 2018    REASON FOR CONSULTATION:  Abdominal aortic aneurysm. HISTORY OF PRESENT ILLNESS:  The patient is a 80-year-old female who presented to the emergency department last night with complaints of lower abdominal pain. She has a suprapubic catheter in place chronically. As part of her evaluation, she had a CT scan of the chest, abdomen and pelvis. She had apparently been known to have an abdominal aortic aneurysm, previously. The CT scan shows moderate enlargement of the entire aorta with a focal significant enlargement of the infrarenal abdominal aorta with a maximum diameter of 5.6 cm. The patient's symptoms are not felt to be related to her aneurysm. She is unable to give any meaningful history. She is awake and responsive, but does not have a firm grasp of her medical situation. PAST MEDICAL HISTORY:  Hypertension, hypothyroidism, chronic urinary retention requiring a suprapubic catheter. ADMISSION MEDICATIONS:  Aspirin, Lasix, Neurontin, hydralazine, Synthroid, Prilosec, Requip. ALLERGIES:  LEVAQUIN, CODEINE, MORPHINE, SULFA DRUGS, MEVACOR. SOCIAL HISTORY:  She lives in an adult home. She has family members who were with her in the emergency room initially, but are not currently present. FAMILY HISTORY:  Unremarkable. REVIEW OF SYSTEMS:  A full review of systems cannot be obtained because of the patient's mental status. PHYSICAL EXAMINATION:  GENERAL:  She is a thin elderly female in no distress. She is awake, alert and responsive. LUNGS:  Bilateral breath sounds. HEART:  Regular rate and rhythm. ABDOMEN:  Suprapubic catheter in the lower abdomen. EXTREMITIES:  Unremarkable. NEUROLOGIC:  Grossly normal with intact motor and sensory function.     IMPRESSION:  Patient has moderate to large infrarenal abdominal aortic aneurysm. Based on size alone the aneurysm would be repaired. However, because of her age and overall condition, I would not recommend intervention. I do not think her current symptoms relate to her aneurysm and there is no evidence of leak or rupture on CT scan. If her aneurysm were to rupture, I would recommend comfort care as opposed to surgical intervention. We are available to answer questions if they arise. There is no need for anticoagulation based on the presence of mural thrombus within the aneurysm.       MD ELKIN Veloz / PAULA  D: 12/13/2018 09:57     T: 12/13/2018 10:40  JOB #: [id number]

## 2018-12-13 NOTE — CONSULTS
[address] [facility]  Emergency Department Consultation    Admit Date: 12/12/2018  Reason for Consultation: Left Inguinal Hernia    CC:  Claire Farrell is a 80 y.o. female who presents to the ED last night with complaints of pelvic pain. HPI:  History significant for hypertension, hypothyroidism, chronic urinary retention with a suprapubic Messina catheter, memory impairment, abdominal aortic aneurysm, and left inguinal hernia. She was in her usual state of health until last night when she began complaining of vaginal pain and pressure, and repeatedly asking for her pants to be loosened. Her son reports \"it sounded like she was complaining of a hard wedgie and wants her pants down to relieve the pressure. \"       Patient has memory impairment, is unaware that she is in the hospital and provides very limited history. Remainder of history is provided by her son at the bedside. Her pain has improved after fentanyl administration. She states she has had the left inguinal hernia for many years. It is not painful and doesn't cause her any problems. She has chronic urinary retention for which the patient has a suprapubic Messina catheter, which is changed monthly and last changed 2 weeks ago. CTA abd/pelvis 12/12/18:  FINDINGS:   There is diffuse dilatation of the thoracic and abdominal aorta with a focal  infrarenal aortic aneurysm measuring 5.5 x 5.6 cm (series 3, image 142),  previously 4.3 x 4.7 cm. There is mural thrombus. Additionally, the ascending  thoracic aorta measures 4.1 cm in diameter. The distal aortic arch measures 3.4  cm in diameter. The mid descending thoracic aorta measures 3.9 cm in diameter. There is no aortic dissection. There is aortoiliac atherosclerosis. The  bilateral common iliac arteries are normal in caliber.     There is a three-vessel aortic arch.  The celiac, superior mesenteric, renal, and  inferior mesenteric arteries are patent although atherosclerosis is noted at the  vessel origins. There is a small hiatal hernia.      There are no dilated bowel loops. There is a moderate amount of colonic stool. There is no free fluid or free air.   There is a left inguinal hernia containing fat and nondilated bowel loops.     There is a probable suprapubic catheter. Streak artifact from a right hip  prosthesis limits pelvic evaluation. The uterus is surgically absent.     IMPRESSION:   1. Diffuse thoracic and abdominal aortic dilatation with a focal infrarenal  aneurysm measuring 5.6 cm in diameter, previously 4.7 cm on 6/5/2015. There is  mural thrombus, but there is no aortic dissection.    2. There is no other acute abnormality in the chest, abdomen, or pelvis. Chronic/incidental findings are stable as above. She has been seen by Vascular Surgery - she is not a candidate for repair of AAA due to advanced age.        Patient Active Problem List    Diagnosis Date Noted    Thoracic aortic aneurysm ([address] 12/12/2018    Fatigue 08/23/2016    UTI (urinary tract infection) due to urinary indwelling catheter ([address] 08/20/2016    Complete heart block ([address] 08/20/2016    Atrial fibrillation ([address] 08/20/2016    Hypocalcemia 43/95/8209    Metabolic acidosis 10/43/5342    Acute respiratory failure with hypoxemia (HCC) 08/20/2016    Altered mental status 08/09/2015    Urinary tract infection 08/09/2015    UTI (urinary tract infection) 99/60/1590    Metabolic encephalopathy 17/99/7366    VANESA (acute kidney injury) ([address] 03/10/2015    Dehydration 03/10/2015    Hypotensive episode 03/10/2015    Persistent vomiting 02/14/2013    Achalasia of esophagus 02/14/2013    Esophageal motor disorder 02/08/2012     Past Medical History:   Diagnosis Date    Arrhythmia     a fib    Arthritis     osteo    CAD (coronary artery disease)     2 heart attacks    GERD (gastroesophageal reflux disease)     Hypertension     Other ill-defined conditions(799.89) heart murmur    Other ill-defined conditions(799.89)     urge incontinence/bladder stimulator    Other ill-defined conditions(799.89)     constipation    Other ill-defined conditions(799.89)     high cholesterol    Other ill-defined conditions(799.89)     recurrent UTI's    Other ill-defined conditions(799.89)     osteoporosis    Other ill-defined conditions(799.89)     hyperlipidemia    Other ill-defined conditions(799.89)     ischemic colitis    Other ill-defined conditions(799.89)     foot drop    Other ill-defined conditions(799.89)     peripheral neuropathy    Psychiatric disorder     anxiety    Stroke [facility])     2 strokes/TIA - right hemiparesis, word finding difficulty    Thromboembolus (HCC)      right arm/blood clot in abdomen    Thyroid disease     hypothyroidism    Unspecified adverse effect of anesthesia     aspiration pneumonia with hip replacement      Past Surgical History:   Procedure Laterality Date    BREAST SURGERY PROCEDURE UNLISTED      multiple lumps removed from both breasts - all benign    CABG, ARTERY-VEIN, THREE      CARDIAC SURG PROCEDURE UNLIST      cardiac cath    HX APPENDECTOMY      HX GI      paraesophageal hernia repair    HX GYN      1/2 right ovary removed    HX HEENT      tonsillectomy    HX HEENT      eyelid surgery    HX HYSTERECTOMY      MANDEEP with SSO    HX HYSTERECTOMY      multiple D & C's    HX LAP CHOLECYSTECTOMY      HX ORTHOPAEDIC      right THR    HX ORTHOPAEDIC      rotator cuff repair - left    HX ORTHOPAEDIC      2 foot surgeries - one for fallen metatarsal    HX ORTHOPAEDIC      left TKR    HX OTHER SURGICAL      EGD      Social History     Tobacco Use    Smoking status: Never Smoker   Substance Use Topics    Alcohol use: No      Family History   Problem Relation Age of Onset    Breast Cancer Mother     Cancer Father         lung    Cancer Son         liver and pancreatic    Cancer Son         lymphoma      Prior to Admission medications    Medication Sig Start Date End Date Taking? Authorizing Provider   acetaminophen (TYLENOL) 325 mg tablet Take 650 mg by mouth every six (6) hours as needed for Pain. Yes Provider, Historical   meclizine (ANTIVERT) 12.5 mg tablet Take 12.5 mg by mouth every four (4) hours as needed for Nausea. Yes Provider, Historical   vit a,c & e-lutein-minerals (OCUVITE) tablet Take 1 Tab by mouth daily. Yes Provider, Historical   ergocalciferol (VITAMIN D2) 50,000 unit capsule Take 50,000 Units by mouth every seven (7) days. Friday   Yes Provider, Historical   ondansetron (ZOFRAN ODT) 4 mg disintegrating tablet Take 4 mg by mouth every six (6) hours as needed for Nausea. Yes Provider, Historical   guaiFENesin (ROBITUSSIN) 100 mg/5 mL liquid Take 100 mg by mouth every four (4) hours as needed for Cough. Yes Provider, Historical   polyethylene glycol (MIRALAX) 17 gram packet Take 17 g by mouth nightly. Yes Provider, Historical   aspirin delayed-release 81 mg tablet Take 81 mg by mouth daily. Yes Provider, Historical   omeprazole (PRILOSEC) 20 mg capsule Take 20 mg by mouth daily. Yes Provider, Historical   rOPINIRole (REQUIP) 0.5 mg tablet Take 0.5 mg by mouth nightly. Yes Provider, Historical   gabapentin (NEURONTIN) 100 mg capsule Take 100 mg by mouth nightly. Yes Provider, Historical   hydrALAZINE (APRESOLINE) 10 mg tablet Take 20 mg by mouth four (4) times daily. Hold for sbp<110 or pulse <60   Yes Provider, Historical   furosemide (LASIX) 20 mg tablet Take 20 mg by mouth daily. Yes Provider, Historical   levothyroxine (SYNTHROID) 100 mcg tablet Take 100 mcg by mouth Daily (before breakfast). Yes Provider, Historical   acetaminophen (TYLENOL) 500 mg tablet Take 500 mg by mouth two (2) times a day. Yes Provider, Historical   cephALEXin (KEFLEX) 250 mg capsule Take 250 mg by mouth daily.    Yes Provider, Historical     Allergies   Allergen Reactions    Levaquin [Levofloxacin] Other (comments)     Heart block    Quinolones Other (comments)     Heart block    Mevacor [Lovastatin] Other (comments)     Leg cramps. Simvastatin ok.  Evista [Raloxifene] Other (comments)     contraindicated    Morphine Unknown (comments)    Perfume Ht52 Other (comments)     Perfume \"stops up nose and throat\"    Codeine Nausea Only    Sulfa (Sulfonamide Antibiotics) Nausea Only          Subjective:     Review of Systems:    A comprehensive review of systems was negative except for that written in the History of Present Illness. Objective:     Blood pressure 104/90, pulse 62, temperature 97.8 °F (36.6 °C), resp. rate 20, height 5' (1.524 m), weight 49.9 kg (110 lb), SpO2 95 %. Recent Results (from the past 24 hour(s))   METABOLIC PANEL, COMPREHENSIVE    Collection Time: 12/12/18  8:20 PM   Result Value Ref Range    Sodium 140 136 - 145 mmol/L    Potassium 4.4 3.5 - 5.1 mmol/L    Chloride 107 97 - 108 mmol/L    CO2 25 21 - 32 mmol/L    Anion gap 8 5 - 15 mmol/L    Glucose 104 (H) 65 - 100 mg/dL    BUN 33 (H) 6 - 20 MG/DL    Creatinine 1.46 (H) 0.55 - 1.02 MG/DL    BUN/Creatinine ratio 23 (H) 12 - 20      GFR est AA 40 (L) >60 ml/min/1.73m2    GFR est non-AA 33 (L) >60 ml/min/1.73m2    Calcium 10.3 (H) 8.5 - 10.1 MG/DL    Bilirubin, total 0.3 0.2 - 1.0 MG/DL    ALT (SGPT) 20 12 - 78 U/L    AST (SGOT) 19 15 - 37 U/L    Alk.  phosphatase 82 45 - 117 U/L    Protein, total 7.2 6.4 - 8.2 g/dL    Albumin 3.9 3.5 - 5.0 g/dL    Globulin 3.3 2.0 - 4.0 g/dL    A-G Ratio 1.2 1.1 - 2.2     CBC WITH AUTOMATED DIFF    Collection Time: 12/12/18  8:20 PM   Result Value Ref Range    WBC 8.9 3.6 - 11.0 K/uL    RBC 3.72 (L) 3.80 - 5.20 M/uL    HGB 9.0 (L) 11.5 - 16.0 g/dL    HCT 30.0 (L) 35.0 - 47.0 %    MCV 80.6 80.0 - 99.0 FL    MCH 24.2 (L) 26.0 - 34.0 PG    MCHC 30.0 30.0 - 36.5 g/dL    RDW 15.5 (H) 11.5 - 14.5 %    PLATELET 738 091 - 874 K/uL    MPV 9.8 8.9 - 12.9 FL    NRBC 0.0 0  WBC    ABSOLUTE NRBC 0.00 0.00 - 0.01 K/uL    NEUTROPHILS 62 32 - 75 %    LYMPHOCYTES 24 12 - 49 %    MONOCYTES 11 5 - 13 %    EOSINOPHILS 2 0 - 7 %    BASOPHILS 1 0 - 1 %    IMMATURE GRANULOCYTES 1 (H) 0.0 - 0.5 %    ABS. NEUTROPHILS 5.5 1.8 - 8.0 K/UL    ABS. LYMPHOCYTES 2.2 0.8 - 3.5 K/UL    ABS. MONOCYTES 0.9 0.0 - 1.0 K/UL    ABS. EOSINOPHILS 0.2 0.0 - 0.4 K/UL    ABS. BASOPHILS 0.1 0.0 - 0.1 K/UL    ABS. IMM. GRANS. 0.0 0.00 - 0.04 K/UL    DF AUTOMATED     SAMPLES BEING HELD    Collection Time: 12/12/18  8:20 PM   Result Value Ref Range    SAMPLES BEING HELD 1RED 1BLU     COMMENT        Add-on orders for these samples will be processed based on acceptable specimen integrity and analyte stability, which may vary by analyte. MAGNESIUM    Collection Time: 12/13/18  4:25 AM   Result Value Ref Range    Magnesium 2.0 1.6 - 2.4 mg/dL   TSH 3RD GENERATION    Collection Time: 12/13/18  4:25 AM   Result Value Ref Range    TSH 4.32 (H) 0.36 - 3.74 uIU/mL   FOLATE    Collection Time: 12/13/18  4:25 AM   Result Value Ref Range    Folate 7.8 5.0 - 21.0 ng/mL   VITAMIN B12    Collection Time: 12/13/18  4:25 AM   Result Value Ref Range    Vitamin B12 512 193 - 986 pg/mL   IRON    Collection Time: 12/13/18  4:25 AM   Result Value Ref Range    Iron 26 (L) 35 - 150 ug/dL   IRON PROFILE    Collection Time: 12/13/18  4:25 AM   Result Value Ref Range    Iron 26 (L) 35 - 150 ug/dL    TIBC 381 250 - 450 ug/dL    Iron % saturation 7 (L) 20 - 50 %   FERRITIN    Collection Time: 12/13/18  4:25 AM   Result Value Ref Range    Ferritin 18 8 - 252 NG/ML     _____________________  Physical Exam:     General:  Alert, cooperative, no distress, appears stated age. Eyes:   Sclera clear. Throat: Lips, mucosa, and tongue normal.   Neck: Supple, symmetrical, trachea midline. Lungs:   Clear to auscultation bilaterally. Heart:  Regular rate and rhythm. Abdomen:   Soft, non-tender. Suprapubic catheter in place. LLQ hernia, soft, easily reducible.   Bowel sounds normal. Assessment:   Principal Problem:    Thoracic aortic aneurysm ([address] (12/12/2018)    left inguinal hernia - soft and easily reducible, though immediately recurs. Plan:     No acute surgical indication at present  Any intervention would be very high risk  Further plan per Dr. Jose F Worthington    Total time spent with patient: 16 minutes. Signed By: Cleopatra Parr NP     December 13, 2018      Pt seen and examined  Longstanding asymptomatic Left inguinal hernia/   No other complaints   Gen- Alert in NAD  Left Inguinal hernia- Easily reducible  Would continue observation  No plans for repair.    Call if questions

## 2018-12-13 NOTE — PROGRESS NOTES
TRANSFER - IN REPORT:    Verbal report received from Francois(name) on Karen Siddiqi  being received from ED(unit) for routine progression of care      Report consisted of patients Situation, Background, Assessment and   Recommendations(SBAR). Information from the following report(s) SBAR, Kardex, ED Summary, Procedure Summary and MAR was reviewed with the receiving nurse. Opportunity for questions and clarification was provided. Assessment completed upon patients arrival to unit and care assumed.

## 2018-12-13 NOTE — PROGRESS NOTES
Patient is seen and examined this morning. She said she doesn't feel comfortable. She couldn't specify the problem but stated \"I don't feel good\"    Lab and imaging reviewed    CT chest Abdomen   IMPRESSION  IMPRESSION:   1. Diffuse thoracic and abdominal aortic dilatation with a focal infrarenal  aneurysm measuring 5.6 cm in diameter, previously 4.7 cm on 6/5/2015. There is  mural thrombus, but there is no aortic dissection.      2. There is no other acute abnormality in the chest, abdomen, or pelvis. Chronic/incidental findings are stable as above. Assessment   Symptomatic Infrarenal AAA warrant repair   - but patient is not a candidate for surgery per vascular surgery evaluation  - medically manage: better blood pressure control with BB, pain mx and avoid anticoag  - other managements per HPI. - Patient will be kept in the intermediate care  - if rupture will go for comfort care.      Code: DNR

## 2018-12-13 NOTE — PROGRESS NOTES
0800 - Bedside and Verbal shift change report given to winnie candelario (oncoming nurse) by gio (offgoing nurse). Report included the following information SBAR, Kardex, ED Summary, Intake/Output, MAR, Recent Results and Cardiac Rhythm NSR.   1530 - Bedside and Verbal shift change report given to chun (oncoming nurse) by Flex Álvarez (offgoing nurse).  Report included the following information SBAR, Kardex, ED Summary, Intake/Output, MAR, Recent Results and Cardiac Rhythm SR.

## 2018-12-13 NOTE — H&P
[address]  HISTORY AND PHYSICAL      Zina Kerr.  MR#: [id number]  : 1919  ACCOUNT #: [de-identified]   ADMIT DATE: 2018    PRIMARY CARE PHYSICIAN:  Alicja Hayden MD     SOURCE OF INFORMATION:  The patient's son and granddaughter who were present at the bedside. CHIEF COMPLAINT:  Pelvic discomfort. HISTORY OF PRESENT ILLNESS:  This is a 51-year-old woman with a past medical history significant for hypertension, hypothyroidism, chronic urinary retention with a suprapubic Messina catheter, memory impairment, abdominal aortic aneurysm, was in her usual state of health until the day of her presentation at the emergency room when patient started complaining of pelvic pain. Patient has memory impairment, unable to provide history. The patient kept repeating discomfort in this pelvic region, thought that these pants were too tight and wanted the pants to be pulled down. She has chronic urinary retention for which the patient has a suprapubic Messina catheter. The suprapubic Messina catheter is usually changed every month. The catheter was recently changed 2 weeks ago. She was sent to the emergency room from the nursing home where the patient resides. When the patient arrived at the emergency room, CT scan of the chest, abdomen and pelvis was obtained. The CT scan shows a thoracic and abdominal aortic dilatation with focal infrarenal aneurysm. The emergency room physician consulted the vascular surgeon on call. Admission to the hospital was advised. Patient was referred to the hospitalist service for that purpose. There was no history of fever, no rigors and no chills. The patient also has a left inguinal hernia. This was easily reduced in the emergency room by the emergency room physician. PAST MEDICAL HISTORY:  Hypertension, hypothyroidism, chronic urinary retention, memory impairment, left inguinal hernia.     ALLERGIES:  PATIENT IS ALLERGIC TO LEVAQUIN, CODEINE, MORPHINE, SULFA, MEVACOR. MEDICATIONS:  Tylenol 650 mg every 6 hours as needed for pain, aspirin 81 mg daily, Keflex 250 mg daily, Lasix 20 mg daily, Neurontin 300 mg daily at bedtime, Robitussin 100 mg every 4 hours as needed for cough, hydralazine 20 mg 4 times daily, Synthroid 100 mcg daily, Antivert 12.5 mg every 4 hours as needed for nausea, Prilosec 20 mg daily, Zofran 4 mg every 6 hours as needed for nausea, MiraLax 17 grams daily at bedtime, Requip 0.5 mg daily at bedtime. FAMILY HISTORY:  This was reviewed. Her mother had breast cancer. Her father had lung cancer. PAST SURGICAL HISTORY:  This is significant for CABG, appendectomy, tonsillectomy, hysterectomy, cholecystectomy, bilateral hip replacement. SOCIAL HISTORY:  No history of alcohol or tobacco abuse. REVIEW OF SYSTEMS:  Unable to obtain because of the patient's mental status. PHYSICAL EXAMINATION:  GENERAL:  Patient appeared ill, in moderate distress. VITAL SIGNS:  On arrival at the emergency room, temperature 97.4, pulse 88, respiratory rate 20, blood pressure 201/89, oxygen saturation 96% on room air. HEAD:  Normocephalic, atraumatic. EYES:  Normal eye movement, no redness, no drainage, no discharge. EARS:  Normal external ears with no obvious drainage. NOSE:  No deformity, no drainage. MOUTH AND THROAT:  No visible oral lesion. NECK:  Supple, no JVD, no thyromegaly. CHEST:  Clear breath sounds. No wheezing, no crackles. HEART:  Normal S1 and S2, regular. No clinically appreciable murmur. ABDOMEN:  Soft, nontender, normal bowel sounds. Suprapubic Messina catheter noted. Left inguinal hernia noted. CENTRAL NERVOUS SYSTEM:  Alert, not oriented. No gross focal neurological deficit. EXTREMITIES:  No edema. Pulses 2+ bilaterally. MUSCULOSKELETAL:  No obvious joint deformity or swelling. SKIN:  No active skin lesion seen in the exposed part of the body. PSYCHIATRIC:  Unable to mood and affect.   LYMPHATIC SYSTEM:  No cervical lymphadenopathy. DIAGNOSTIC DATA:  CT scan of the chest, abdomen and pelvis shows a thoracic and abdominal aortic dilatation with a focal infrarenal aneurysm. There is a mural thrombus, but there is no aortic dissection. LABORATORY DATA:  Hematology:  WBC 8.9, hemoglobin 9.0, hematocrit 30.0, platelet 895. Chemistry:  Sodium 140, potassium 4.4, chloride 107, CO2 of 25, glucose 104, BUN 33, creatinine 1.46, calcium 10.3, total bilirubin 0.3, ALT 20, AST 19, alkaline phosphatase 82, total protein 7.2, albumin level 3.9, globulin 3.3. ASSESSMENT:  1. Thoracic and abdominal aortic dilatation with a focal infrarenal aneurysm. 2.  Acute kidney injury. 3.  Anemia. 4.  Hypertension. 5.  Hypothyroidism. 6.  Chronic urinary retention. 7.  Left inguinal hernia repair. 8.  Memory impairment. PLAN:  1. Thoracic and abdominal aortic dilatation with a focal infrarenal aneurysm. We will admit the patient for further evaluation and treatment. We will start the patient on Cardene. We will await further recommendation from the surgeon consulted by the emergency room physician. The patient has a mural thrombus. Will leave the decision to anticoagulation for the surgeon to determine. If the surgeon advises anticoagulation, then we will start the patient on anticoagulation for the mural thrombus. Attempt was made to discuss the findings with the surgeon, but a call to the surgeon during the night was not returned. 2.  Acute kidney injury. Will carry out fluid therapy. This is most likely due to volume depletion. We will hold Lasix and avoid nephrotoxic medications. 3.  Anemia. This is most likely due to chronic disease. Will carry anemia workup including checking a stool guaiac to rule out occult gastrointestinal bleed. 4.  Hypertension. Patient will be placed on Cardene for treatment of the aortic aneurysm. This will also help treat the patient's hypertension.   5. Hypothyroidism. We will continue with Synthroid. We will check a TSH level. 6.  Chronic urinary retention. Will continue with suprapubic catheter. 7.  Left inguinal hernia. This was reduced in the emergency room, but the family is concerned about the inguinal hernia as a possible cause of the pelvic pain. General surgery consult will be requested to assist in further evaluation. 8.  Memory impairment. Will continue with supportive therapy. We will obtain a CT scan of the head to rule out acute pathology. 9.  Other issues. CODE STATUS:  THE PATIENT IS A DNR. Will request SCD for DVT prophylaxis.       Vera London MD       RE/LN  D: 12/13/2018 06:01     T: 12/13/2018 09:36  JOB #: [id number]  CC: Samantha Bender MD

## 2018-12-13 NOTE — ED NOTES
5533:  Patient complaining that \"she is strangling\"  Oxygen saturation is at 98%. Patient continues to clear throat, coughing, trying to clear throat. Given more sips of water. Patient repeats over and over, \"I'm going to strangle, I can't breath. O2 sats remain normal.    0615: Attempted to suction back of patient's throat, continues to complain of \"strangling feeling\", respirations elevated. Unsuccessful attempt. Tried more sips of water, tolerating well, does not help throat. 1594:  Continue to remain at bedside, patient continues to cry out \"Please help me, I feel like I could strangle. Encouraging patient to clear throat, continuing to give patient sips of water. Patient  Cleared throat and spit, hard pieces of mucous and phlegm came up and she was able to spit into tissue. Offered more sips of water. Patient still continues to hold throat, oxygen saturation at 99%, respirations WDP. Will continue to monitor patient. [id number] Cockayne with hospitalist on Team 7 to assess patient for downgrade, is coming to see patient.

## 2018-12-14 LAB
ALBUMIN SERPL-MCNC: 3.1 G/DL (ref 3.5–5)
ALBUMIN/GLOB SERPL: 1.1 {RATIO} (ref 1.1–2.2)
ALP SERPL-CCNC: 65 U/L (ref 45–117)
ALT SERPL-CCNC: 16 U/L (ref 12–78)
ANION GAP SERPL CALC-SCNC: 5 MMOL/L (ref 5–15)
APPEARANCE UR: ABNORMAL
APPEARANCE UR: ABNORMAL
AST SERPL-CCNC: 25 U/L (ref 15–37)
BACTERIA URNS QL MICRO: ABNORMAL /HPF
BACTERIA URNS QL MICRO: ABNORMAL /HPF
BASOPHILS # BLD: 0 K/UL (ref 0–0.1)
BASOPHILS NFR BLD: 0 % (ref 0–1)
BILIRUB SERPL-MCNC: 0.3 MG/DL (ref 0.2–1)
BILIRUB UR QL: NEGATIVE
BILIRUB UR QL: NEGATIVE
BUN SERPL-MCNC: 30 MG/DL (ref 6–20)
BUN/CREAT SERPL: 21 (ref 12–20)
CALCIUM SERPL-MCNC: 9.5 MG/DL (ref 8.5–10.1)
CHLORIDE SERPL-SCNC: 109 MMOL/L (ref 97–108)
CO2 SERPL-SCNC: 28 MMOL/L (ref 21–32)
COLOR UR: ABNORMAL
COLOR UR: ABNORMAL
CREAT SERPL-MCNC: 1.41 MG/DL (ref 0.55–1.02)
DIFFERENTIAL METHOD BLD: ABNORMAL
EOSINOPHIL # BLD: 0.2 K/UL (ref 0–0.4)
EOSINOPHIL NFR BLD: 3 % (ref 0–7)
EPITH CASTS URNS QL MICRO: ABNORMAL /LPF
EPITH CASTS URNS QL MICRO: ABNORMAL /LPF
ERYTHROCYTE [DISTWIDTH] IN BLOOD BY AUTOMATED COUNT: 15.5 % (ref 11.5–14.5)
GLOBULIN SER CALC-MCNC: 2.9 G/DL (ref 2–4)
GLUCOSE SERPL-MCNC: 96 MG/DL (ref 65–100)
GLUCOSE UR STRIP.AUTO-MCNC: NEGATIVE MG/DL
GLUCOSE UR STRIP.AUTO-MCNC: NEGATIVE MG/DL
HCT VFR BLD AUTO: 26.2 % (ref 35–47)
HEMOCCULT STL QL: NEGATIVE
HGB BLD-MCNC: 7.7 G/DL (ref 11.5–16)
HGB UR QL STRIP: ABNORMAL
HGB UR QL STRIP: NEGATIVE
IMM GRANULOCYTES # BLD: 0 K/UL (ref 0–0.04)
IMM GRANULOCYTES NFR BLD AUTO: 0 % (ref 0–0.5)
KETONES UR QL STRIP.AUTO: NEGATIVE MG/DL
KETONES UR QL STRIP.AUTO: NEGATIVE MG/DL
LEUKOCYTE ESTERASE UR QL STRIP.AUTO: ABNORMAL
LEUKOCYTE ESTERASE UR QL STRIP.AUTO: ABNORMAL
LYMPHOCYTES # BLD: 1.9 K/UL (ref 0.8–3.5)
LYMPHOCYTES NFR BLD: 28 % (ref 12–49)
MCH RBC QN AUTO: 23.8 PG (ref 26–34)
MCHC RBC AUTO-ENTMCNC: 29.4 G/DL (ref 30–36.5)
MCV RBC AUTO: 80.9 FL (ref 80–99)
MONOCYTES # BLD: 0.6 K/UL (ref 0–1)
MONOCYTES NFR BLD: 9 % (ref 5–13)
NEUTS SEG # BLD: 4 K/UL (ref 1.8–8)
NEUTS SEG NFR BLD: 59 % (ref 32–75)
NITRITE UR QL STRIP.AUTO: POSITIVE
NITRITE UR QL STRIP.AUTO: POSITIVE
NRBC # BLD: 0 K/UL (ref 0–0.01)
NRBC BLD-RTO: 0 PER 100 WBC
PH UR STRIP: 7.5 [PH] (ref 5–8)
PH UR STRIP: 7.5 [PH] (ref 5–8)
PHOSPHATE SERPL-MCNC: 3.4 MG/DL (ref 2.6–4.7)
PLATELET # BLD AUTO: 243 K/UL (ref 150–400)
PMV BLD AUTO: 9.6 FL (ref 8.9–12.9)
POTASSIUM SERPL-SCNC: 4.2 MMOL/L (ref 3.5–5.1)
PROT SERPL-MCNC: 6 G/DL (ref 6.4–8.2)
PROT UR STRIP-MCNC: 30 MG/DL
PROT UR STRIP-MCNC: 30 MG/DL
RBC # BLD AUTO: 3.24 M/UL (ref 3.8–5.2)
RBC #/AREA URNS HPF: ABNORMAL /HPF (ref 0–5)
RBC #/AREA URNS HPF: ABNORMAL /HPF (ref 0–5)
SODIUM SERPL-SCNC: 142 MMOL/L (ref 136–145)
SP GR UR REFRACTOMETRY: 1.02 (ref 1–1.03)
SP GR UR REFRACTOMETRY: 1.03 (ref 1–1.03)
UA: UC IF INDICATED,UAUC: ABNORMAL
UR CULT HOLD, URHOLD: NORMAL
UROBILINOGEN UR QL STRIP.AUTO: 0.2 EU/DL (ref 0.2–1)
UROBILINOGEN UR QL STRIP.AUTO: 0.2 EU/DL (ref 0.2–1)
WBC # BLD AUTO: 6.8 K/UL (ref 3.6–11)
WBC URNS QL MICRO: >100 /HPF (ref 0–4)
WBC URNS QL MICRO: >100 /HPF (ref 0–4)

## 2018-12-14 PROCEDURE — 74011000258 HC RX REV CODE- 258: Performed by: INTERNAL MEDICINE

## 2018-12-14 PROCEDURE — 87186 SC STD MICRODIL/AGAR DIL: CPT

## 2018-12-14 PROCEDURE — 36415 COLL VENOUS BLD VENIPUNCTURE: CPT

## 2018-12-14 PROCEDURE — 94762 N-INVAS EAR/PLS OXIMTRY CONT: CPT

## 2018-12-14 PROCEDURE — 84100 ASSAY OF PHOSPHORUS: CPT

## 2018-12-14 PROCEDURE — 74011250637 HC RX REV CODE- 250/637: Performed by: INTERNAL MEDICINE

## 2018-12-14 PROCEDURE — 85025 COMPLETE CBC W/AUTO DIFF WBC: CPT

## 2018-12-14 PROCEDURE — 81001 URINALYSIS AUTO W/SCOPE: CPT

## 2018-12-14 PROCEDURE — 87077 CULTURE AEROBIC IDENTIFY: CPT

## 2018-12-14 PROCEDURE — 65660000001 HC RM ICU INTERMED STEPDOWN

## 2018-12-14 PROCEDURE — 87086 URINE CULTURE/COLONY COUNT: CPT

## 2018-12-14 PROCEDURE — 94760 N-INVAS EAR/PLS OXIMETRY 1: CPT

## 2018-12-14 PROCEDURE — 74011000250 HC RX REV CODE- 250: Performed by: INTERNAL MEDICINE

## 2018-12-14 PROCEDURE — 74011250636 HC RX REV CODE- 250/636: Performed by: INTERNAL MEDICINE

## 2018-12-14 PROCEDURE — 80053 COMPREHEN METABOLIC PANEL: CPT

## 2018-12-14 PROCEDURE — 77030034850

## 2018-12-14 PROCEDURE — 82272 OCCULT BLD FECES 1-3 TESTS: CPT

## 2018-12-14 RX ORDER — METOPROLOL TARTRATE 25 MG/1
6.25 TABLET, FILM COATED ORAL EVERY 12 HOURS
Status: DISCONTINUED | OUTPATIENT
Start: 2018-12-14 | End: 2018-12-14

## 2018-12-14 RX ORDER — GABAPENTIN 100 MG/1
100 CAPSULE ORAL ONCE
Status: COMPLETED | OUTPATIENT
Start: 2018-12-14 | End: 2018-12-14

## 2018-12-14 RX ORDER — HYDRALAZINE HYDROCHLORIDE 10 MG/1
20 TABLET, FILM COATED ORAL 4 TIMES DAILY
Status: DISCONTINUED | OUTPATIENT
Start: 2018-12-14 | End: 2018-12-16 | Stop reason: HOSPADM

## 2018-12-14 RX ORDER — METOPROLOL TARTRATE 25 MG/1
12.5 TABLET, FILM COATED ORAL EVERY 12 HOURS
Status: DISCONTINUED | OUTPATIENT
Start: 2018-12-14 | End: 2018-12-14

## 2018-12-14 RX ADMIN — PANTOPRAZOLE SODIUM 40 MG: 40 TABLET, DELAYED RELEASE ORAL at 06:59

## 2018-12-14 RX ADMIN — HYDRALAZINE HYDROCHLORIDE 20 MG: 10 TABLET, FILM COATED ORAL at 17:13

## 2018-12-14 RX ADMIN — Medication 10 ML: at 21:20

## 2018-12-14 RX ADMIN — METOPROLOL TARTRATE 12.5 MG: 25 TABLET ORAL at 08:36

## 2018-12-14 RX ADMIN — ACETAMINOPHEN 650 MG: 325 TABLET ORAL at 19:31

## 2018-12-14 RX ADMIN — HYDRALAZINE HYDROCHLORIDE 20 MG: 10 TABLET, FILM COATED ORAL at 13:13

## 2018-12-14 RX ADMIN — GABAPENTIN 100 MG: 100 CAPSULE ORAL at 14:38

## 2018-12-14 RX ADMIN — GABAPENTIN 100 MG: 100 CAPSULE ORAL at 21:09

## 2018-12-14 RX ADMIN — HYDRALAZINE HYDROCHLORIDE 20 MG: 10 TABLET, FILM COATED ORAL at 21:09

## 2018-12-14 RX ADMIN — CEFTRIAXONE 1 G: 1 INJECTION, POWDER, FOR SOLUTION INTRAMUSCULAR; INTRAVENOUS at 13:13

## 2018-12-14 RX ADMIN — Medication 10 ML: at 06:59

## 2018-12-14 RX ADMIN — ROPINIROLE HYDROCHLORIDE 0.5 MG: 0.25 TABLET, FILM COATED ORAL at 21:09

## 2018-12-14 RX ADMIN — LEVOTHYROXINE SODIUM 100 MCG: 100 TABLET ORAL at 06:58

## 2018-12-14 RX ADMIN — ASPIRIN 81 MG: 81 TABLET, COATED ORAL at 08:37

## 2018-12-14 RX ADMIN — Medication 10 ML: at 13:13

## 2018-12-14 NOTE — PROGRESS NOTES
Problem: Aortic Aneurysm Repair: Post Op Day 3  Goal: Treatments/Interventions/Procedures  Outcome: Progressing Towards Goal  Plan for medical management of AAA, orders for Labetalol gtt, pt has been off gtt overnight, Dr. Stuart Hayden paged this am for clarification. Orders received to discontinue Labetalol gtt, pt stated on PO Metoprolol. Pt educated on goal of care, no evidence of learning. Will continue to monitor and educate. Bedside shift change report given to [facility] (oncoming nurse) by Alaina Funes (offgoing nurse). Report included the following information SBAR, Kardex, ED Summary, Procedure Summary, Intake/Output, MAR, Recent Results, Med Rec Status, Cardiac Rhythm NSR, Alarm Parameters  and Quality Measures.

## 2018-12-14 NOTE — PROGRESS NOTES
Bedside shift change report given to Karel Coulter RN (oncoming nurse) by Becky Davies RN (offgoing nurse). Report included the following information SBAR, Kardex, MAR, Accordion, Recent Results and Cardiac Rhythm Sinus [place]/NSR.

## 2018-12-14 NOTE — PROGRESS NOTES
Hospitalist Progress Note  Aidan Moore MD  Answering service: [phone] from in house phone      Date of Service:  2018  NAME:  Sheeba Bynum  :  1919  MRN:  [id number]      Admission Summary:   The patient is a 54-year-old female who presented to the emergency department last night with complaints of lower abdominal pain. She has a suprapubic catheter in place chronically. As part of her evaluation, she had a CT scan of the chest, abdomen and pelvis. She had apparently been known to have an abdominal aortic aneurysm. Interval history / Subjective:   Patient seen and examined this morning. She stated the abdominal pain is better. No fever or other   Significant event reported per nursing. Patient evaluated by Vascular surgery for AAA repair but given her age  She is not a candidate for repair and if rupture will go for comfort care. Additional work up will be done to   Determine the cause of her abdominal pain. 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    - CT abdomen showed   - patient started on Cardene drip in the ED which was later change to labetalol drip to keep low normal BP  - Patient seen by vascular surgery. She isn't a candidate for surgical repair and if rupture will go for comfort care  - no need for anticoagulation  - today start on low dose oral BB and Aspirin   - no need for anticoag due to   - Patient has previous hx of complete heart block and avoided AV derek blockers on discharge. # VANESA on ? CKD  - likely from volume depletion from decreased intake and diuresis  - hold on Lasix  - ct with IVF   - avoid nephrotoxic drug and renally dose medication     # Anemia    - This is most likely due to chronic disease.    - Will carry anemia workup including checking a stool guaiac to rule out occult gastrointestinal bleed. # Hypertension.    - patient started on Cardene drip in the ED which was later change to labetalol drip to keep low normal BP  - switched to oral BB    # Hypothyroidism  - continue with Synthroid  - check a TSH level. # Chronic urinary retention  - suprapubic catheter care and change after UA check     # Left inguinal hernia. - This was reduced in the emergency room, but the family is concerned about the inguinal hernia as a possible cause of the pelvic pain. - General surgery consult will be requested to assist in further evaluation. # Memory impairment. Will continue with supportive therapy. We will obtain a CT scan of the head to rule out acute pathology. # CAD with history of CABG(POA)  - on Aspirin, re-started low dose BB    CODE STATUS:  THE PATIENT IS A DNR. Will request SCD for DVT prophylaxis. Disposition: Will be back to nursing home        Hospital Problems  Date Reviewed: 12/13/2018          Codes Class Noted POA    * (Principal) Thoracic aortic aneurysm [facility]) ICD-10-CM: I71.2  ICD-9-CM: 441.2  12/12/2018 Yes                Review of Systems:   Review of systems were negative except mentioned in HPI  Abdomen: no pain, N/V      Vital Signs:    Last 24hrs VS reviewed since prior progress note. Most recent are:  Visit Vitals  /82 (BP 1 Location: Right arm, BP Patient Position: At rest;Supine)   Pulse 73   Temp 97.6 °F (36.4 °C)   Resp 20   Ht 5' (1.524 m)   Wt 51.3 kg (113 lb 1.5 oz)   SpO2 96%   Breastfeeding?  No   BMI 22.09 kg/m²         Intake/Output Summary (Last 24 hours) at 12/14/2018 0901  Last data filed at 12/13/2018 1600  Gross per 24 hour   Intake 422.5 ml   Output 170 ml   Net 252.5 ml Physical Examination:             Constitutional:  Pleasant old age, stated age, No acute distress, cooperative   ENT:  Oral mucous dry, oropharynx benign. Neck supple,    Resp:  CTA bilaterally. No wheezing/rhonchi/rales. No accessory muscle use   CV:  Regular rhythm, normal rate, no murmurs, gallops, rubs    GI:  Suprapubic urinary cath in place, Soft, non distended, non tender. normoactive bowel sounds, no hepatosplenomegaly     Musculoskeletal:  No edema, warm, 2+ pulses throughout    Neurologic:  Moves all extremities. AAOx3, CN II-XII reviewed  Gait not checked. Patient stated she doesn't walk she is on a wheelchair             Data Review:   I have personally reviewed the labs and imaging     Labs:     Recent Labs     12/14/18 0330 12/12/18 2020   WBC 6.8 8.9   HGB 7.7* 9.0*   HCT 26.2* 30.0*    294     Recent Labs     12/14/18 0330 12/13/18 0425 12/12/18 2020     --  140   K 4.2  --  4.4   *  --  107   CO2 28  --  25   BUN 30*  --  33*   CREA 1.41*  --  1.46*   GLU 96  --  104*   CA 9.5  --  10.3*   MG  --  2.0  --    PHOS 3.4  --   --      Recent Labs     12/14/18 0330 12/13/18  1031 12/12/18 2020   SGOT 25 18 19   ALT 16 16 20   AP 65 65 82   TBILI 0.3 0.3 0.3   TP 6.0* 6.0* 7.2   ALB 3.1* 3.1* 3.9   GLOB 2.9 2.9 3.3     Recent Labs     12/13/18  1031   INR 1.1   PTP 11.3*      Recent Labs     12/13/18 0425   TIBC 381   PSAT 7*   FERR 18      Lab Results   Component Value Date/Time    Folate 7.8 12/13/2018 04:25 AM      No results for input(s): PH, PCO2, PO2 in the last 72 hours. No results for input(s): CPK, CKNDX, TROIQ in the last 72 hours.     No lab exists for component: CPKMB  Lab Results   Component Value Date/Time    Cholesterol, total 179 08/23/2016 03:57 AM    HDL Cholesterol 34 08/23/2016 03:57 AM    LDL, calculated 118.4 (H) 08/23/2016 03:57 AM    Triglyceride 133 08/23/2016 03:57 AM    CHOL/HDL Ratio 5.3 (H) 08/23/2016 03:57 AM       Medications Reviewed: Current Facility-Administered Medications   Medication Dose Route Frequency    metoprolol tartrate (LOPRESSOR) tablet 6.25 mg  6.25 mg Oral Q12H    aspirin delayed-release tablet 81 mg  81 mg Oral DAILY    gabapentin (NEURONTIN) capsule 100 mg  100 mg Oral QHS    guaiFENesin (ROBITUSSIN) 100 mg/5 mL oral liquid 100 mg  100 mg Oral Q4H PRN    levothyroxine (SYNTHROID) tablet 100 mcg  100 mcg Oral ACB    meclizine (ANTIVERT) tablet 12.5 mg  12.5 mg Oral Q6H PRN    polyethylene glycol (MIRALAX) packet 17 g  17 g Oral QHS    rOPINIRole (REQUIP) tablet 0.5 mg  0.5 mg Oral QHS    sodium chloride (NS) flush 5-10 mL  5-10 mL IntraVENous Q8H    sodium chloride (NS) flush 5-10 mL  5-10 mL IntraVENous PRN    HYDROmorphone (DILAUDID) injection 0.2 mg  0.2 mg IntraVENous Q6H PRN    ondansetron (ZOFRAN) injection 4 mg  4 mg IntraVENous Q4H PRN    bisacodyl (DULCOLAX) tablet 5 mg  5 mg Oral DAILY PRN    pantoprazole (PROTONIX) tablet 40 mg  40 mg Oral ACB    acetaminophen (TYLENOL) tablet 650 mg  650 mg Oral Q4H PRN     ______________________________________________________________________  EXPECTED LENGTH OF STAY: 3d 7h  ACTUAL LENGTH OF STAY:          2                 Virgen Harris MD

## 2018-12-14 NOTE — PROGRESS NOTES
Bedside shift change report given to [facility] (oncoming nurse) by Alfred (offgoing nurse). Report included the following information SBAR, Kardex, ED Summary, [facility] and Recent Results.

## 2018-12-14 NOTE — PROGRESS NOTES
Problem: Falls - Risk of  Goal: *Absence of Falls  Document Paul Fall Risk and appropriate interventions in the flowsheet. Outcome: Progressing Towards Goal  Fall Risk Interventions:     Bed is in the lowest position and wheels are locked, call bell is within reach, bathroom light is on during evening hours, gripper socks are on and patient has been instructed to call out for assistance if needed. As of now, patient is free from falls and will continue to be monitored. Problem: Pressure Injury - Risk of  Goal: *Prevention of pressure injury  Document Virgilio Scale and appropriate interventions in the flowsheet.   Outcome: Progressing Towards Goal  Pressure Injury Interventions:  Sensory Interventions: Assess changes in LOC, Check visual cues for pain, Keep linens dry and wrinkle-free, Maintain/enhance activity level, Pressure redistribution bed/mattress (bed type)         Activity Interventions: Assess need for specialty bed, Increase time out of bed, Pressure redistribution bed/mattress(bed type), PT/OT evaluation    Mobility Interventions: Assess need for specialty bed, HOB 30 degrees or less, Pressure redistribution bed/mattress (bed type), PT/OT evaluation    Nutrition Interventions: Document food/fluid/supplement intake, Offer support with meals,snacks and hydration    Friction and Shear Interventions: Apply protective barrier, creams and emollients, HOB 30 degrees or less, Lift sheet

## 2018-12-14 NOTE — PROGRESS NOTES
Suprapubic tube changed per order. RN states Dr. Chetan Dyer office places a 16 Fr with 10 ml balloon. SPT replaced with return of yellow clear urine. Pt tolerated well.     Mel Cardenas Urology RN Coordinator, [phone]

## 2018-12-15 LAB
ANION GAP SERPL CALC-SCNC: 5 MMOL/L (ref 5–15)
BUN SERPL-MCNC: 31 MG/DL (ref 6–20)
BUN/CREAT SERPL: 22 (ref 12–20)
CALCIUM SERPL-MCNC: 9 MG/DL (ref 8.5–10.1)
CHLORIDE SERPL-SCNC: 108 MMOL/L (ref 97–108)
CO2 SERPL-SCNC: 28 MMOL/L (ref 21–32)
CREAT SERPL-MCNC: 1.38 MG/DL (ref 0.55–1.02)
ERYTHROCYTE [DISTWIDTH] IN BLOOD BY AUTOMATED COUNT: 15.4 % (ref 11.5–14.5)
GLUCOSE SERPL-MCNC: 93 MG/DL (ref 65–100)
HCT VFR BLD AUTO: 28 % (ref 35–47)
HGB BLD-MCNC: 8.3 G/DL (ref 11.5–16)
MCH RBC QN AUTO: 24.1 PG (ref 26–34)
MCHC RBC AUTO-ENTMCNC: 29.6 G/DL (ref 30–36.5)
MCV RBC AUTO: 81.4 FL (ref 80–99)
NRBC # BLD: 0 K/UL (ref 0–0.01)
NRBC BLD-RTO: 0 PER 100 WBC
PLATELET # BLD AUTO: 245 K/UL (ref 150–400)
PMV BLD AUTO: 9.8 FL (ref 8.9–12.9)
POTASSIUM SERPL-SCNC: 4.2 MMOL/L (ref 3.5–5.1)
RBC # BLD AUTO: 3.44 M/UL (ref 3.8–5.2)
SODIUM SERPL-SCNC: 141 MMOL/L (ref 136–145)
WBC # BLD AUTO: 6.5 K/UL (ref 3.6–11)

## 2018-12-15 PROCEDURE — 85027 COMPLETE CBC AUTOMATED: CPT

## 2018-12-15 PROCEDURE — 80048 BASIC METABOLIC PNL TOTAL CA: CPT

## 2018-12-15 PROCEDURE — 74011000258 HC RX REV CODE- 258: Performed by: INTERNAL MEDICINE

## 2018-12-15 PROCEDURE — 74011250637 HC RX REV CODE- 250/637: Performed by: INTERNAL MEDICINE

## 2018-12-15 PROCEDURE — 74011250636 HC RX REV CODE- 250/636: Performed by: INTERNAL MEDICINE

## 2018-12-15 PROCEDURE — 77010033678 HC OXYGEN DAILY

## 2018-12-15 PROCEDURE — 65270000029 HC RM PRIVATE

## 2018-12-15 PROCEDURE — 36415 COLL VENOUS BLD VENIPUNCTURE: CPT

## 2018-12-15 RX ORDER — DEXTROSE MONOHYDRATE AND SODIUM CHLORIDE 5; .45 G/100ML; G/100ML
50 INJECTION, SOLUTION INTRAVENOUS CONTINUOUS
Status: DISCONTINUED | OUTPATIENT
Start: 2018-12-15 | End: 2018-12-16 | Stop reason: HOSPADM

## 2018-12-15 RX ADMIN — LEVOTHYROXINE SODIUM 100 MCG: 100 TABLET ORAL at 06:58

## 2018-12-15 RX ADMIN — PANTOPRAZOLE SODIUM 40 MG: 40 TABLET, DELAYED RELEASE ORAL at 06:58

## 2018-12-15 RX ADMIN — CEFTRIAXONE 1 G: 1 INJECTION, POWDER, FOR SOLUTION INTRAMUSCULAR; INTRAVENOUS at 12:40

## 2018-12-15 RX ADMIN — Medication 10 ML: at 06:58

## 2018-12-15 RX ADMIN — ACETAMINOPHEN 650 MG: 325 TABLET ORAL at 18:24

## 2018-12-15 RX ADMIN — HYDRALAZINE HYDROCHLORIDE 20 MG: 10 TABLET, FILM COATED ORAL at 12:38

## 2018-12-15 RX ADMIN — Medication 10 ML: at 21:50

## 2018-12-15 RX ADMIN — ACETAMINOPHEN 650 MG: 325 TABLET ORAL at 08:13

## 2018-12-15 RX ADMIN — Medication 10 ML: at 15:50

## 2018-12-15 RX ADMIN — HYDRALAZINE HYDROCHLORIDE 20 MG: 10 TABLET, FILM COATED ORAL at 18:24

## 2018-12-15 RX ADMIN — HYDROMORPHONE HYDROCHLORIDE 0.2 MG: 2 INJECTION INTRAMUSCULAR; INTRAVENOUS; SUBCUTANEOUS at 12:30

## 2018-12-15 RX ADMIN — ASPIRIN 81 MG: 81 TABLET, COATED ORAL at 08:11

## 2018-12-15 RX ADMIN — DEXTROSE MONOHYDRATE AND SODIUM CHLORIDE 50 ML/HR: 5; .45 INJECTION, SOLUTION INTRAVENOUS at 10:56

## 2018-12-15 RX ADMIN — HYDROMORPHONE HYDROCHLORIDE 0.2 MG: 2 INJECTION INTRAMUSCULAR; INTRAVENOUS; SUBCUTANEOUS at 21:50

## 2018-12-15 RX ADMIN — HYDRALAZINE HYDROCHLORIDE 20 MG: 10 TABLET, FILM COATED ORAL at 08:11

## 2018-12-15 NOTE — PROGRESS NOTES
Hospitalist Progress Note  Awilda Iglesias MD  Answering service: [phone] from in house phone      Date of Service:  12/15/2018  NAME:  Claire Farrell  :  1919  MRN:  [id number]      Admission Summary:   The patient is a 68-year-old female who presented to the emergency department last night with complaints of lower abdominal pain. She has a suprapubic catheter in place chronically. As part of her evaluation, she had a CT scan of the chest, abdomen and pelvis. She had apparently been known to have an abdominal aortic aneurysm. Interval history / Subjective:   Patient seen and examined this morning. She was sleeping, per nurse she was awake the whole morning. No even reported. 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    - CT abdomen showed   - patient started on Cardene drip in the ED which was later change to labetalol drip to keep low normal BP  - Patient seen by vascular surgery. She isn't a candidate for surgical repair and if rupture will go for comfort care  - no need for anticoagulation   - Patient has previous hx of complete heart block and avoided AV derek blockers on discharge. - ct with hydralazine and Aspirin    # Left inguinal hernia. - This was reduced in the emergency room, but the family is concerned about the inguinal hernia as a possible cause of the pelvic pain. - General surgery consult will be requested to assist in further evaluation. # VANESA on ? CKD  - likely from volume depletion from decreased intake and diuresis  - hold on Lasix  - ct with IVF   - avoid nephrotoxic drug and renally dose medication     # Anemia    - This is most likely due to chronic disease.    - Will carry anemia workup including checking a stool guaiac to rule out occult gastrointestinal bleed. # Hypertension   - patient started on Cardene drip in the ED which was later change to labetalol drip to keep low normal BP  - switched to oral BB    # Hypothyroidism  - continue with Synthroid  - check a TSH level. # Chronic urinary retention  - suprapubic catheter care and change after UA check     # Memory impairment. Will continue with supportive therapy. We will obtain a CT scan of the head to rule out acute pathology. # CAD with history of CABG(POA)  - on Aspirin, hold on BB    CODE STATUS:  THE PATIENT IS A DNR. Will request SCD for DVT prophylaxis. Disposition: Will be back to nursing home        Hospital Problems  Date Reviewed: 12/13/2018          Codes Class Noted POA    * (Principal) Thoracic aortic aneurysm [facility]) ICD-10-CM: I71.2  ICD-9-CM: 441.2  12/12/2018 Yes                Review of Systems:   Review of systems were negative except mentioned in HPI  Abdomen: no pain, N/V      Vital Signs:    Last 24hrs VS reviewed since prior progress note. Most recent are:  Visit Vitals  /73   Pulse 63   Temp 97.6 °F (36.4 °C)   Resp 14   Ht 5' (1.524 m)   Wt 56.1 kg (123 lb 11.2 oz)   SpO2 95%   Breastfeeding? No   BMI 24.16 kg/m²         Intake/Output Summary (Last 24 hours) at 12/15/2018 1056  Last data filed at 12/15/2018 0900  Gross per 24 hour   Intake 1620 ml   Output 400 ml   Net 1220 ml        Physical Examination:             Constitutional:  Pleasant old age, stated age, No acute distress, cooperative   ENT:  Oral mucous dry, oropharynx benign. Neck supple,    Resp:  CTA bilaterally. No wheezing/rhonchi/rales.  No accessory muscle use   CV:  Regular rhythm, normal rate, no murmurs, gallops, rubs    GI:  Suprapubic urinary cath in place, Soft, non distended, non tender. normoactive bowel sounds, no hepatosplenomegaly     Musculoskeletal:  No edema, warm, 2+ pulses throughout    Neurologic:  Moves all extremities. AAOx3, CN II-XII reviewed  Gait not checked. Patient stated she doesn't walk she is on a wheelchair             Data Review:   I have personally reviewed the labs and imaging     Labs:     Recent Labs     12/15/18  0738 12/14/18  0330   WBC 6.5 6.8   HGB 8.3* 7.7*   HCT 28.0* 26.2*    243     Recent Labs     12/15/18  0738 12/14/18  0330 12/13/18  0425 12/12/18 2020    142  --  140   K 4.2 4.2  --  4.4    109*  --  107   CO2 28 28  --  25   BUN 31* 30*  --  33*   CREA 1.38* 1.41*  --  1.46*   GLU 93 96  --  104*   CA 9.0 9.5  --  10.3*   MG  --   --  2.0  --    PHOS  --  3.4  --   --      Recent Labs     12/14/18  0330 12/13/18  1031 12/12/18 2020   SGOT 25 18 19   ALT 16 16 20   AP 65 65 82   TBILI 0.3 0.3 0.3   TP 6.0* 6.0* 7.2   ALB 3.1* 3.1* 3.9   GLOB 2.9 2.9 3.3     Recent Labs     12/13/18  1031   INR 1.1   PTP 11.3*      Recent Labs     12/13/18  0425   TIBC 381   PSAT 7*   FERR 18      Lab Results   Component Value Date/Time    Folate 7.8 12/13/2018 04:25 AM      No results for input(s): PH, PCO2, PO2 in the last 72 hours. No results for input(s): CPK, CKNDX, TROIQ in the last 72 hours.     No lab exists for component: CPKMB  Lab Results   Component Value Date/Time    Cholesterol, total 179 08/23/2016 03:57 AM    HDL Cholesterol 34 08/23/2016 03:57 AM    LDL, calculated 118.4 (H) 08/23/2016 03:57 AM    Triglyceride 133 08/23/2016 03:57 AM    CHOL/HDL Ratio 5.3 (H) 08/23/2016 03:57 AM       Medications Reviewed:     Current Facility-Administered Medications   Medication Dose Route Frequency    dextrose 5 % - 0.45% NaCl infusion  50 mL/hr IntraVENous CONTINUOUS    hydrALAZINE (APRESOLINE) tablet 20 mg  20 mg Oral QID    cefTRIAXone (ROCEPHIN) 1 g in 0.9% sodium chloride (MBP/ADV) 50 mL  1 g IntraVENous Q24H    aspirin delayed-release tablet 81 mg  81 mg Oral DAILY    gabapentin (NEURONTIN) capsule 100 mg  100 mg Oral QHS    guaiFENesin (ROBITUSSIN) 100 mg/5 mL oral liquid 100 mg  100 mg Oral Q4H PRN    levothyroxine (SYNTHROID) tablet 100 mcg  100 mcg Oral ACB    meclizine (ANTIVERT) tablet 12.5 mg  12.5 mg Oral Q6H PRN    polyethylene glycol (MIRALAX) packet 17 g  17 g Oral QHS    rOPINIRole (REQUIP) tablet 0.5 mg  0.5 mg Oral QHS    sodium chloride (NS) flush 5-10 mL  5-10 mL IntraVENous Q8H    sodium chloride (NS) flush 5-10 mL  5-10 mL IntraVENous PRN    HYDROmorphone (DILAUDID) injection 0.2 mg  0.2 mg IntraVENous Q6H PRN    ondansetron (ZOFRAN) injection 4 mg  4 mg IntraVENous Q4H PRN    bisacodyl (DULCOLAX) tablet 5 mg  5 mg Oral DAILY PRN    pantoprazole (PROTONIX) tablet 40 mg  40 mg Oral ACB    acetaminophen (TYLENOL) tablet 650 mg  650 mg Oral Q4H PRN     ______________________________________________________________________  EXPECTED LENGTH OF STAY: 3d 7h  ACTUAL LENGTH OF STAY:          3                 Maame Wooten MD

## 2018-12-15 NOTE — PROGRESS NOTES
Problem: Pressure Injury - Risk of  Goal: *Prevention of pressure injury  Document Virgilio Scale and appropriate interventions in the flowsheet. Outcome: Progressing Towards Goal  Pressure redistribution mattress and pillows in use. Patient turned and repositioned frequently. Skin is intact, sacrum red blanchable. Bedside shift change report given to Bridger (oncoming nurse) by Lor Tapia (offgoing nurse).  Report included the following information SBAR, Kardex, ED Summary, Intake/Output, MAR, Recent Results and Cardiac Rhythm SR SB.

## 2018-12-15 NOTE — PROGRESS NOTES
Reason for Admission:   Thoracic aortic aneurysm                   RRAT Score:  8                   Plan for utilizing home health:      None at this time, likely pt will return to [address]. Likelihood of Readmission:  Low                          Transition of Care Plan:   Return to [address]. Unit CM participated in [facility] rounds today. Weekend CM can assist if pt is ready for return to [address].     FITZ Emanuel

## 2018-12-15 NOTE — PROGRESS NOTES
Problem: Infection - Risk of, Urinary Catheter-Associated Urinary Tract Infection  Goal: *Absence of infection signs and symptoms  Outcome: Progressing Towards Goal  Pt started on IV antibiotics yesterday, pt tolerating well. Suprapubic catheter changed yesterday and repeat urinalysis sent off per order. Pt afebrile. Will continue to monitor and educate. TRANSFER - OUT REPORT:    Verbal report given to Manolo Jones RN(name) on Karen S Neeru  being transferred to [facility](unit) for routine progression of care       Report consisted of patients Situation, Background, Assessment and   Recommendations(SBAR). Information from the following report(s) SBAR, Kardex, ED Summary, Procedure Summary, Intake/Output, MAR, Recent Results, Med Rec Status, Cardiac Rhythm NSR, Alarm Parameters  and Quality Measures was reviewed with the receiving nurse. Lines:   Peripheral IV 12/12/18 Right Forearm (Active)   Site Assessment Clean, dry, & intact 12/15/2018  8:00 AM   Phlebitis Assessment 0 12/15/2018  8:00 AM   Infiltration Assessment 0 12/15/2018  8:00 AM   Dressing Status Clean, dry, & intact 12/15/2018  8:00 AM   Dressing Type Tape;Transparent 12/15/2018  8:00 AM   Hub Color/Line Status Pink;Capped 12/15/2018  8:00 AM   Action Taken Open ports on tubing capped 12/15/2018  8:00 AM   Alcohol Cap Used Yes 12/15/2018  8:00 AM        Opportunity for questions and clarification was provided.       Patient transported with:   [employer]

## 2018-12-16 VITALS
SYSTOLIC BLOOD PRESSURE: 181 MMHG | RESPIRATION RATE: 16 BRPM | OXYGEN SATURATION: 93 % | DIASTOLIC BLOOD PRESSURE: 78 MMHG | WEIGHT: 123.7 LBS | HEIGHT: 60 IN | HEART RATE: 73 BPM | TEMPERATURE: 98.2 F | BODY MASS INDEX: 24.29 KG/M2

## 2018-12-16 LAB
ATRIAL RATE: 77 BPM
CALCULATED P AXIS, ECG09: -8 DEGREES
CALCULATED R AXIS, ECG10: -36 DEGREES
CALCULATED T AXIS, ECG11: 107 DEGREES
DIAGNOSIS, 93000: NORMAL
P-R INTERVAL, ECG05: 190 MS
Q-T INTERVAL, ECG07: 364 MS
QRS DURATION, ECG06: 108 MS
QTC CALCULATION (BEZET), ECG08: 411 MS
TROPONIN I SERPL-MCNC: <0.05 NG/ML
VENTRICULAR RATE, ECG03: 77 BPM

## 2018-12-16 PROCEDURE — 74011000250 HC RX REV CODE- 250: Performed by: INTERNAL MEDICINE

## 2018-12-16 PROCEDURE — 94760 N-INVAS EAR/PLS OXIMETRY 1: CPT

## 2018-12-16 PROCEDURE — 74011000258 HC RX REV CODE- 258: Performed by: INTERNAL MEDICINE

## 2018-12-16 PROCEDURE — 74011250636 HC RX REV CODE- 250/636: Performed by: INTERNAL MEDICINE

## 2018-12-16 PROCEDURE — 84484 ASSAY OF TROPONIN QUANT: CPT

## 2018-12-16 PROCEDURE — 74011250637 HC RX REV CODE- 250/637: Performed by: INTERNAL MEDICINE

## 2018-12-16 PROCEDURE — 36415 COLL VENOUS BLD VENIPUNCTURE: CPT

## 2018-12-16 PROCEDURE — 93005 ELECTROCARDIOGRAM TRACING: CPT

## 2018-12-16 RX ORDER — PANTOPRAZOLE SODIUM 40 MG/1
40 TABLET, DELAYED RELEASE ORAL
Qty: 30 TAB | Refills: 1 | Status: SHIPPED | OUTPATIENT
Start: 2018-12-16

## 2018-12-16 RX ADMIN — HYDRALAZINE HYDROCHLORIDE 20 MG: 10 TABLET, FILM COATED ORAL at 14:00

## 2018-12-16 RX ADMIN — DEXTROSE MONOHYDRATE AND SODIUM CHLORIDE 50 ML/HR: 5; .45 INJECTION, SOLUTION INTRAVENOUS at 05:27

## 2018-12-16 RX ADMIN — Medication 10 ML: at 05:27

## 2018-12-16 RX ADMIN — CEFTRIAXONE 1 G: 1 INJECTION, POWDER, FOR SOLUTION INTRAMUSCULAR; INTRAVENOUS at 14:00

## 2018-12-16 RX ADMIN — HYDROMORPHONE HYDROCHLORIDE 0.2 MG: 2 INJECTION INTRAMUSCULAR; INTRAVENOUS; SUBCUTANEOUS at 10:51

## 2018-12-16 RX ADMIN — Medication 10 ML: at 14:00

## 2018-12-16 RX ADMIN — FAMOTIDINE 20 MG: 10 INJECTION, SOLUTION INTRAVENOUS at 10:29

## 2018-12-16 RX ADMIN — HYDROMORPHONE HYDROCHLORIDE 0.2 MG: 2 INJECTION INTRAMUSCULAR; INTRAVENOUS; SUBCUTANEOUS at 03:12

## 2018-12-16 NOTE — DISCHARGE INSTRUCTIONS

## 2018-12-16 NOTE — PROGRESS NOTES
0932: pt restless, medications held at present time. Pt unable to swallow nightly medications. Will continue to monitor.

## 2018-12-16 NOTE — PROGRESS NOTES
Report called to [facility] spoke sukhwinder Arevalo.   Answered all questions -transport set up

## 2018-12-16 NOTE — PROGRESS NOTES
9:04 AM  CM informed to contact [facility] to identify if they are able to accept patient back for services today. CM contacted [facility] [phone]. Facility able to accept and accommodate patient back for services today. Patient to return to [facility]. RN to call report to [phone]. RN notified of updates. CM to set up transport when notified of discharge orders and when ready.     FITZ Gary/CRM

## 2018-12-16 NOTE — DISCHARGE SUMMARY
Discharge Summary       PATIENT ID: Lieutenant Mahoney  MRN: [id number]   YOB: 1919    DATE OF ADMISSION: 12/12/2018  7:39 PM    DATE OF DISCHARGE: 12/16/18  PRIMARY CARE PROVIDER: Afshan Charles MD       DISCHARGING PROVIDER: Valentin Garcia MD    To contact this individual call [phone] and ask the  to page. If unavailable ask to be transferred the Adult Hospitalist Department. CONSULTATIONS: IP CONSULT TO GENERAL SURGERY  IP CONSULT TO VASCULAR SURGERY  IP CONSULT TO UROLOGY      PROCEDURES/SURGERIES: * No surgery found *    IMAGING  Ct Head Wo Cont    Result Date: 12/13/2018  IMPRESSION: No significant change or acute finding. Cta Chest W Or W Wo Cont    Result Date: 12/12/2018  IMPRESSION: 1. Diffuse thoracic and abdominal aortic dilatation with a focal infrarenal aneurysm measuring 5.6 cm in diameter, previously 4.7 cm on 6/5/2015. There is mural thrombus, but there is no aortic dissection. 2. There is no other acute abnormality in the chest, abdomen, or pelvis. Chronic/incidental findings are stable as above. Cta Abdomen Pelv W Cont    Result Date: 12/12/2018  IMPRESSION: 1. Diffuse thoracic and abdominal aortic dilatation with a focal infrarenal aneurysm measuring 5.6 cm in diameter, previously 4.7 cm on 6/5/2015. There is mural thrombus, but there is no aortic dissection. 2. There is no other acute abnormality in the chest, abdomen, or pelvis. Chronic/incidental findings are stable as above. [address] COURSE:   This is a 66-year-old woman with a past medical history significant for hypertension, hypothyroidism, chronic urinary retention with a suprapubic Mess[place] catheter, memory impairment, abdominal aortic aneurysm, was in her usual state of health until the day of her presentation at the emergency room when patient started complaining of pelvic pain.   Patient has memory impairment, unable to provide history. The patient kept repeating discomfort in this pelvic region, thought that these pants were too tight and wanted the pants to be pulled down. She has chronic urinary retention for which the patient has a suprapubic Mess[place] catheter. The suprapubic Mess[place] catheter is usually changed every month. The catheter was recently changed 2 weeks ago. She was sent to the emergency room from the nursing home where the patient resides. When the patient arrived at the emergency room, CT scan of the chest, abdomen and pelvis was obtained. The CT scan shows a thoracic and abdominal aortic dilatation with focal infrarenal aneurysm. The emergency room physician consulted the vascular surgeon on call. Admission to the hospital was advised. Patient was referred to the hospitalist service for that purpose. There was no history of fever, no rigors and no chills. The patient also has a left inguinal hernia. This was easily reduced in the emergency room by the emergency room physician.     # Abdominal pain no clear cause likely CAUTI vs Symptomatic AAA vs inguinal hernia vs GERD   - Patient has suprapubic cath in place which was change per HP  - no other systemic sign of infection   - hernia reduced in the ED  - UA positive for infection, culture pending   - IVF to increase urine flow   - started on Ceftriaxone  - patient has epigastric pain which responded to famotidine     # CAUTI  - UA positive for infection, culture pending   - IVF to increase urine flow   - started on Ceftriaxone      # Thoracic and abdominal aortic dilatation with a focal infrarenal aneurysm.    - CT abdomen showed   - patient started on Cardene drip in the ED which was later change to labetalol drip to keep low normal BP  - Patient seen by vascular surgery.  She isn't a candidate for surgical repair and if rupture will go for comfort care  - no need for anticoagulation   - Patient has previous hx of complete heart block and avoided AV derek blockers on discharge. - ct with hydralazine and Aspirin     # Left inguinal hernia.    - This was reduced in the emergency room, but the family is concerned about the inguinal hernia as a possible cause of the pelvic pain.    - General surgery consult will be requested to assist in further evaluation.        # VANESA on ? CKD  - likely from volume depletion from decreased intake and diuresis  - hold on Lasix  - ct with IVF   - avoid nephrotoxic drug and renally dose medication      # Anemia    - This is most likely due to chronic disease.    - Will carry anemia workup including checking a stool guaiac to rule out occult gastrointestinal bleed.       # Hypertension   - patient started on Cardene drip in the ED which was later change to labetalol drip to keep low normal BP  - switched to oral BB     # Hypothyroidism  - continue with Synthroid  - check a TSH level.     # Chronic urinary retention  - suprapubic catheter care and change after UA check      # Memory impairment.  Will continue with supportive therapy.  We will obtain a CT scan of the head to rule out acute pathology.       # CAD with history of CABG(POA)  - on Aspirin, hold on BB          DISCHARGE DIAGNOSES / PLAN:    # Abdominal pain no clear cause likely CAUTI vs Symptomatic AAA vs inguinal hernia vs GERD  - UA positive for infection, culture pending, to be followed by Dr Colby Kumar. I have personally spoken to him about the result over the phone.   - continue on Ceftriaxone, until culture result   - encourage oral intake   - continue with pantoprazole      # CAUIT  - UA positive for infection w/ GNR, culture pending   - IVF to increase urine flow   - continue on Ceftriaxone    # Thoracic and abdominal aortic dilatation with a focal infrarenal aneurysm.    - Patient seen by vascular surgery.  She isn't a candidate for surgical repair and if rupture will go for comfort care     # Left inguinal hernia.    - successfully reduced       # VANESA on ? CKD  - d/oma Lasix on discharge   - encourage oral intake      # Anemia    - stable      # Hypertension   - resume home meds      # Hypothyroidism  - resume home meds      # Chronic urinary retention  - catheter changed in the hospital   - follow up with PCP and Urology      # Memory impairment     # CAD with history of CABG(POA)  - resume home Aspirin     Patient Active Problem List   Diagnosis Code    Esophageal motor disorder K22.4    Persistent vomiting R11.10    Achalasia of esophagus F48.2    Metabolic encephalopathy Z11.15    VANESA (acute kidney injury) ([address] N17.9    Dehydration E86.0    Hypotensive episode I95.9    UTI (urinary tract infection) N39.0    Altered mental status R41.82    Urinary tract infection N39.0    UTI (urinary tract infection) due to urinary indwelling catheter ([address] T83.511A, N39.0    Complete heart block ([address] I44.2    Atrial fibrillation ([address] I48.91    Hypocalcemia G97.97    Metabolic acidosis V34.0    Acute respiratory failure with hypoxemia ([address] J96.01    Fatigue R53.83    Thoracic aortic aneurysm ([address] I71.2               PENDING TEST RESULTS:   At the time of discharge the following test results are still pending: Urine culture     FOLLOW UP APPOINTMENTS:    Follow-up Information     Follow up With Specialties Details Why Contact Info    Alisa Dominguez MD [facility] In 1 day  [address]      Kinga Keen MD Urology In 1 week  [address] [facility]  [phone]             ADDITIONAL CARE RECOMMENDATIONS: Follow up with culture result     DIET: cardiac regular     ACTIVITY: as tolerated     WOUND CARE: None     EQUIPMENT needed: None       DISCHARGE MEDICATIONS:  Current Discharge Medication List      START taking these medications    Details   pantoprazole (PROTONIX) 40 mg tablet Take 1 Tab by mouth Daily (before breakfast).   Qty: 30 Tab, Refills: 1 cefTRIAXone 1 gram 1 g, ADDaptor 1 Device IVPB 1 g by IntraVENous route every twenty-four (24) hours. Qty: 3 Dose, Refills: 0         CONTINUE these medications which have NOT CHANGED    Details   !! acetaminophen (TYLENOL) 325 mg tablet Take 650 mg by mouth every six (6) hours as needed for Pain. meclizine (ANTIVERT) 12.5 mg tablet Take 12.5 mg by mouth every four (4) hours as needed for Nausea. vit a,c & e-lutein-minerals (OCUVITE) tablet Take 1 Tab by mouth daily. ergocalciferol (VITAMIN D2) 50,000 unit capsule Take 50,000 Units by mouth every seven (7) days. Friday      ondansetron (ZOFRAN ODT) 4 mg disintegrating tablet Take 4 mg by mouth every six (6) hours as needed for Nausea. guaiFENesin (ROBITUSSIN) 100 mg/5 mL liquid Take 100 mg by mouth every four (4) hours as needed for Cough. polyethylene glycol (MIRALAX) 17 gram packet Take 17 g by mouth nightly. aspirin delayed-release 81 mg tablet Take 81 mg by mouth daily. rOPINIRole (REQUIP) 0.5 mg tablet Take 0.5 mg by mouth nightly.      gabapentin (NEURONTIN) 100 mg capsule Take 100 mg by mouth nightly. hydrALAZINE (APRESOLINE) 10 mg tablet Take 20 mg by mouth four (4) times daily. Hold for sbp<110 or pulse <60      levothyroxine (SYNTHROID) 100 mcg tablet Take 100 mcg by mouth Daily (before breakfast). !! acetaminophen (TYLENOL) 500 mg tablet Take 500 mg by mouth two (2) times a day. !! - Potential duplicate medications found. Please discuss with provider.       STOP taking these medications       omeprazole (PRILOSEC) 20 mg capsule Comments:   Reason for Stopping:         furosemide (LASIX) 20 mg tablet Comments:   Reason for Stopping:         cephALEXin (KEFLEX) 250 mg capsule Comments:   Reason for Stopping:               All Micro Results     Procedure Component Value Units Date/Time    CULTURE, URINE [[id number]]  (Abnormal) Collected:  12/14/18 6279    Order Status:  Completed Specimen:  Urine Updated: 12/16/18 0729     Special Requests: --        NO SPECIAL REQUESTS  Reflexed from [id number]       [facility] Count --        >100,000  COLONIES/mL       Culture result:       GRAM NEGATIVE RODS . Kaye Gonsalez(OXIDASE POSITIVE)                  CHECKING FOR POSSIBLE 2ND GRAM NEGATIVE VALERIA                  PROBABLE ENTEROCOCCUS SPECIES          URINE CULTURE HOLD SAMPLE [[id number]] Collected:  12/14/18 0842    Order Status:  Completed Specimen:  Serum Updated:  12/14/18 0850     Urine culture hold       URINE ON HOLD IN MICROBIOLOGY DEPT FOR 3 DAYS. IF UNPRESERVED URINE IS SUBMITTED, IT CANNOT BE USED FOR ADDITIONAL TESTING AFTER 24 HRS, RECOLLECTION WILL BE REQUIRED. Recent Results (from the past 24 hour(s))   TROPONIN I    Collection Time: 12/16/18 10:58 AM   Result Value Ref Range    Troponin-I, Qt. <0.05 <0.05 ng/mL   EKG, 12 LEAD, INITIAL    Collection Time: 12/16/18 11:11 AM   Result Value Ref Range    Ventricular Rate 77 BPM    Atrial Rate 77 BPM    P-R Interval 190 ms    QRS Duration 108 ms    Q-T Interval 364 ms    QTC Calculation (Bezet) 411 ms    Calculated P Axis -8 degrees    Calculated R Axis -36 degrees    Calculated T Axis 107 degrees    Diagnosis       Normal sinus rhythm  Left axis deviation  Left ventricular hypertrophy with repolarization abnormality  Abnormal ECG  When compared with ECG of 20-AUG-2016 15:57,  Sinus rhythm has replaced Atrial fibrillation  Vent. rate has increased BY  42 BPM  QRS duration has decreased  Criteria for Septal infarct are no longer present             NOTIFY YOUR PHYSICIAN FOR ANY OF THE FOLLOWING:   Fever over 101 degrees for 24 hours. Chest pain, shortness of breath, fever, chills, nausea, vomiting, diarrhea, change in mentation, falling, weakness, bleeding. Severe pain or pain not relieved by medications. Or, any other signs or symptoms that you may have questions about.     DISPOSITION:    Home With:   OT  PT  HH  RN      x Long term SNF/Inpatient Rehab Independent/assisted living    Hospice    Other:       PATIENT CONDITION AT DISCHARGE:     Functional status    Poor    x Deconditioned     Independent      Cognition     Lucid    x Forgetful     Dementia      Catheters/lines (plus indication)   x Mess[place]     PICC     PEG     None      Code status     Full code    x DNR      PHYSICAL EXAMINATION AT DISCHARGE:  Constitutional:  Pleasant old age, stated age, No acute distress, cooperative   ENT:  Oral mucous dry, oropharynx benign. Neck supple,    Resp:  CTA bilaterally. No wheezing/rhonchi/rales. No accessory muscle use   CV:  Regular rhythm, normal rate, no murmurs, gallops, rubs    GI:  Suprapubic urinary cath in place, Soft, non distended, non tender. normoactive bowel sounds, no hepatosplenomegaly     Musculoskeletal:  No edema, warm, 2+ pulses throughout    Neurologic:  Moves all extremities. AAOx3, CN II-XII reviewed  Gait not checked.  Patient stated she doesn't walk she is on a wheelchair              [address]:  Problem List as of 12/16/2018 Date Reviewed: 12/13/2018          Codes Class Noted - Resolved    * (Principal) Thoracic aortic aneurysm ([facility] ICD-10-CM: I71.2  ICD-9-CM: 441.2  12/12/2018 - Present        Fatigue ICD-10-CM: R53.83  ICD-9-CM: 780.79  8/23/2016 - Present        UTI (urinary tract infection) due to urinary indwelling catheter ([facility] ICD-10-CM: T83.511A, N39.0  ICD-9-CM: 996.64, 599.0  8/20/2016 - Present        Complete heart block ([facility] ICD-10-CM: I44.2  ICD-9-CM: 426.0  8/20/2016 - Present        Atrial fibrillation (HCC) ICD-10-CM: I48.91  ICD-9-CM: 427.31  8/20/2016 - Present        Hypocalcemia ICD-10-CM: E83.51  ICD-9-CM: 275.41  8/20/2016 - Present        Metabolic acidosis J-50-MT: E87.2  ICD-9-CM: 276.2  8/20/2016 - Present        Acute respiratory failure with hypoxemia (HCC) ICD-10-CM: J96.01  ICD-9-CM: 518.81  8/20/2016 - Present        Altered mental status ICD-10-CM: R41.82  ICD-9-CM: 780.97  8/9/2015 - Present        Urinary tract infection ICD-10-CM: N39.0  ICD-9-CM: 599.0  8/9/2015 - Present        UTI (urinary tract infection) ICD-10-CM: N39.0  ICD-9-CM: 599.0  6/5/2015 - Present        Metabolic encephalopathy EMN-23-KN: G93.41  ICD-9-CM: 348.31  3/10/2015 - Present        VANESA (acute kidney injury) ([facility] ICD-10-CM: N17.9  ICD-9-CM: 584.9  3/10/2015 - Present        Dehydration ICD-10-CM: E86.0  ICD-9-CM: 276.51  3/10/2015 - Present        Hypotensive episode ICD-10-CM: I95.9  ICD-9-CM: 458.9  3/10/2015 - Present        Persistent vomiting ICD-10-CM: R11.10  ICD-9-CM: 536.2  2/14/2013 - Present        Achalasia of esophagus ICD-10-CM: K22.0  ICD-9-CM: 530.0  2/14/2013 - Present        Esophageal motor disorder ICD-10-CM: K22.4  ICD-9-CM: 530.5  2/8/2012 - Present        RESOLVED: Persistent vomiting ICD-10-CM: R11.10  ICD-9-CM: 536.2  2/5/2012 - 2/16/2012                Discussed with patient and family. Explained the importance of following up, Compliance with medications and recommendations on discharge,Side effect profile of medications were explained. Safety precautions at home and while taking pain medications also explained. All questions answered to the satisfaction of the patient/family. Discussed with consultant(s) who are agreeable to the discharge. Verbal and written instructions on discharge given. Explained about Discharge medications and side effect profile.         Thank you Simi Dale MD for taking care of your patient, Please call with any questions.       Greater than 30 minutes were spent with the patient on counseling and coordination of care    Signed:   Shamir Donato MD  12/16/2018  2:06 PM

## 2018-12-17 LAB
BACTERIA SPEC CULT: ABNORMAL
CC UR VC: ABNORMAL
SERVICE CMNT-IMP: ABNORMAL

## 2018-12-17 NOTE — PROGRESS NOTES
Spoke with night supervisor Rishi Muse at [facility] regarding discharge papers not being sent with patient. Rishi Muse states that another patients paperwork was sent at transfer. Reviewed discharge summary from Dr Ben Argueta regarding follow up appointments, stopped medications and new meds. I did inform Rishi Muse to call the unit in the am, and ask to have a copy of the patient discharge papers faxed over to [facility] for this patient.